# Patient Record
Sex: MALE | Race: ASIAN | NOT HISPANIC OR LATINO
[De-identification: names, ages, dates, MRNs, and addresses within clinical notes are randomized per-mention and may not be internally consistent; named-entity substitution may affect disease eponyms.]

---

## 2022-09-29 VITALS
TEMPERATURE: 96 F | DIASTOLIC BLOOD PRESSURE: 77 MMHG | HEART RATE: 83 BPM | OXYGEN SATURATION: 97 % | HEIGHT: 64 IN | SYSTOLIC BLOOD PRESSURE: 143 MMHG

## 2022-09-29 RX ORDER — LOSARTAN POTASSIUM 100 MG/1
1 TABLET, FILM COATED ORAL
Qty: 0 | Refills: 0 | DISCHARGE

## 2022-09-29 RX ORDER — CHLORHEXIDINE GLUCONATE 213 G/1000ML
1 SOLUTION TOPICAL ONCE
Refills: 0 | Status: DISCONTINUED | OUTPATIENT
Start: 2022-10-03 | End: 2022-10-17

## 2022-09-29 NOTE — H&P ADULT - NSHPLABSRESULTS_GEN_ALL_CORE
EKG: NSR at 81 bpm. 0.5-1 mm J point elevation V2-V3. 0.5 mm 1 mm ST depression V4-V6. TWI III. Q wave III

## 2022-09-29 NOTE — H&P ADULT - HISTORY OF PRESENT ILLNESS
COVID:    Pharmacy:   Cardiologist: Dr. Jerez  Escort:     62 year old Cantonese-speaking male, current smoker, with PMHx HTN, uncontrolled DM-2 (A1c 14.0) and HLD (, recently started on statin), moderate b/l carotid stenosis, presented to Dr. Jerez with c/o leg pain with ambulation and DAMON. He reports occasional chest pain but does not walk much due to DAMON and leg fatigue. Denies dizziness, palpitations, orthopnea/PND, leg swelling, LOC, bleeding, melena/hematochezia, fever, chills, URI symptoms, or recent illness. Exercise NST 9/2/22: hypotensive response to exercise with marked ST depression on EKG; inferior and inferolateral infarct with moderate edgardo-infarct ischemia; large anterior wall ischemia. TID 1.19. TTE 8/9/22: EF 40-45%, grade 1 diastolic dysfunction, akinetic basal inferolateral, basal inferior, mid inferolateral, mid inferior, and mid inferoseptal. Dyskinetic mid anterolateral. No significant valvular disease. LE arterial duplex- no hemodynamically significant stenosis.    In light of risk factors, CCS class III anginal equivalent symptoms, and high risk NST suggestive of multivessel CAD, patient is referred for cardiac catheterization with possible intervention if clinically indicated.    COVID:    Pharmacy:   Cardiologist: Dr. Jerez  Escort:     62 year old Cantonese-speaking male, current smoker, with PMHx HTN, uncontrolled DM-2 (A1c 14.0) and HLD (, recently started on statin), moderate b/l carotid stenosis, presented to Dr. Jerez with c/o leg pain with ambulation and DAMON. He reports occasional chest pain but does not walk much due to DAMON and leg fatigue. Denies dizziness, palpitations, orthopnea/PND, leg swelling, LOC, bleeding, melena/hematochezia, fever, chills, URI symptoms, or recent illness. Exercise NST 9/2/22: hypotensive response to exercise with marked ST depression on EKG; inferior and inferolateral infarct with moderate edgardo-infarct ischemia; large anterior wall ischemia., TID 1.19, EF 38% TTE 8/9/22: EF 40-45%, grade 1 diastolic dysfunction, akinetic basal inferolateral, basal inferior, mid inferolateral, mid inferior, and mid inferoseptal. Dyskinetic mid anterolateral. No significant valvular disease. LE arterial duplex- no hemodynamically significant stenosis.    In light of risk factors, CCS class III anginal equivalent symptoms, and high risk NST suggestive of multivessel CAD, patient is referred for cardiac catheterization with possible intervention if clinically indicated.    COVID:  9/30/22 negative in [facility]  Pharmacy:   Cardiologist: Dr. Jerez  Escort:     62 year old Cantonese-speaking male, current smoker, with PMHx HTN, uncontrolled DM-2 (A1c 14.0) and HLD (, recently started on statin), moderate b/l carotid stenosis, presented to Dr. Jerez with c/o leg pain with ambulation and DAMON. He reports occasional chest pain but does not walk much due to DAMON and leg fatigue. Denies dizziness, palpitations, orthopnea/PND, leg swelling, LOC, bleeding, melena/hematochezia, fever, chills, URI symptoms, or recent illness. Exercise NST 9/2/22: hypotensive response to exercise with marked ST depression on EKG; inferior and inferolateral infarct with moderate edgardo-infarct ischemia; large anterior wall ischemia., TID 1.19, EF 38% TTE 8/9/22: EF 40-45%, grade 1 diastolic dysfunction, akinetic basal inferolateral, basal inferior, mid inferolateral, mid inferior, and mid inferoseptal. Dyskinetic mid anterolateral. No significant valvular disease. LE arterial duplex- no hemodynamically significant stenosis.    In light of risk factors, CCS class III anginal equivalent symptoms, and high risk NST suggestive of multivessel CAD, patient is referred for cardiac catheterization with possible intervention if clinically indicated.    COVID:  9/30/22 negative in [facility]  Pharmacy: [facility]-medications obtained from list provided by patient's wife   Cardiologist: Dr. Jerez  Escort: Wife     62 year old Cantonese-speaking male, current smoker, with PMHx HTN, uncontrolled DM-2 (A1c 14.0) and HLD (, recently started on statin), moderate b/l carotid stenosis, presented to Dr. Jerez with c/o leg pain with ambulation and DAMON. He reports occasional chest pain but does not walk much due to DAMON and leg fatigue. Denies dizziness, palpitations, orthopnea/PND, leg swelling, LOC, bleeding, melena/hematochezia, fever, chills, URI symptoms, or recent illness. Exercise NST 9/2/22: hypotensive response to exercise with marked ST depression on EKG; inferior and inferolateral infarct with moderate edgardo-infarct ischemia; large anterior wall ischemia., TID 1.19, EF 38% TTE 8/9/22: EF 40-45%, grade 1 diastolic dysfunction, akinetic basal inferolateral, basal inferior, mid inferolateral, mid inferior, and mid inferoseptal. Dyskinetic mid anterolateral. No significant valvular disease. LE arterial duplex- no hemodynamically significant stenosis.    In light of risk factors, CCS class III anginal equivalent symptoms, and high risk NST suggestive of multivessel CAD, patient is referred for cardiac catheterization with possible intervention if clinically indicated.

## 2022-09-29 NOTE — H&P ADULT - ASSESSMENT
62 year old Cantonese-speaking male, current smoker, with PMHx HTN, uncontrolled DM-2 (A1c 14.0) and HLD (, recently started on statin), moderate b/l carotid stenosis, who presents for cardiac cath due to patient's risk factors, CCS class III anginal equivalent symptoms, and high risk NST suggestive of multivessel CAD.    ASA III          Mallampati III  Patient is a candidate for sedation: yes  Sedation: Moderate    Risks & benefits of procedure and alternative therapy have been explained to the patient including but not limited to: allergic reaction, bleeding w/possible need for blood transfusion, infection, renal and vascular compromise, limb damage, arrhythmia, stroke, vessel dissection/perforation, Myocardial infarction, emergent CABG. Informed consent obtained and in chart.       Patient denies bleeding, BRBPR, melena, hematuria, hematemesis.        cc Bolus IV x 1 followed by NS 75 cc/hr x 2 hrs per Hydration Protocol.  62 year old Cantonese-speaking male, current smoker, with PMHx HTN, uncontrolled DM-2 (A1c 14.0) and HLD (, recently started on statin), moderate b/l carotid stenosis, who presents for cardiac cath due to patient's risk factors, CCS class III anginal equivalent symptoms, and high risk NST suggestive of multivessel CAD.    ASA III          Mallampati III  Patient is a candidate for sedation: yes  Sedation: Moderate    Risks & benefits of procedure and alternative therapy have been explained to the patient including but not limited to: allergic reaction, bleeding w/possible need for blood transfusion, infection, renal and vascular compromise, limb damage, arrhythmia, stroke, vessel dissection/perforation, Myocardial infarction, emergent CABG. Informed consent obtained and in chart.       Labs significant for Anemia Hgb/HCT 11.1/33.1 and Platelets 195,000 (normal). Patient denies bleeding, BRBPR, melena, hematuria, hematemesis. Patient is not currently on ASA or Plavix.      EF 40-45% by echo 8/9/22. Patient euvolemic on exam. No JVD or LE edema. Lungs CTA.  cc Bolus IV x 1 followed by NS 75 cc/hr x 2 hrs per Hydration Protocol.  62 year old Cantonese-speaking male, current smoker, with PMHx HTN, uncontrolled DM-2 (A1c 14.0) and HLD (, recently started on statin), moderate b/l carotid stenosis, who presents for cardiac cath due to patient's risk factors, CCS class III anginal equivalent symptoms, and high risk NST suggestive of multivessel CAD.    ASA III          Mallampati III  Patient is a candidate for sedation: yes  Sedation: Moderate    Risks & benefits of procedure and alternative therapy have been explained to the patient including but not limited to: allergic reaction, bleeding w/possible need for blood transfusion, infection, renal and vascular compromise, limb damage, arrhythmia, stroke, vessel dissection/perforation, Myocardial infarction, emergent CABG. Informed consent obtained and in chart.       Labs significant for Anemia Hgb/HCT 11.1/33.1 and Platelets 195,000 (normal). Patient denies bleeding, BRBPR, melena, hematuria, hematemesis. Patient is not currently on ASA or Plavix. Given high risk NST suggestive of multivessel CAD no Plavix load prior to procedure. ASA  mg PO x 1 given prior to procedure.      EF 40-45% by echo 8/9/22. Patient euvolemic on exam. No JVD or LE edema. Lungs CTA.  cc Bolus IV x 1 followed by NS 75 cc/hr x 2 hrs per Hydration Protocol.

## 2022-09-29 NOTE — H&P ADULT - NSICDXPASTMEDICALHX_GEN_ALL_CORE_FT
PAST MEDICAL HISTORY:  Carotid stenosis     Hyperlipidemia     Hypertension     Ischemic cardiomyopathy     Type 2 diabetes mellitus

## 2022-09-29 NOTE — H&P ADULT - RESPIRATORY
clear to auscultation bilaterally/no wheezes/no rales/no rhonchi/no respiratory distress/no use of accessory muscles/respirations non-labored

## 2022-10-03 ENCOUNTER — RESULT REVIEW (OUTPATIENT)
Age: 62
End: 2022-10-03

## 2022-10-03 ENCOUNTER — OUTPATIENT (OUTPATIENT)
Dept: OUTPATIENT SERVICES | Facility: HOSPITAL | Age: 62
LOS: 1 days | Discharge: HOME | End: 2022-10-03
Payer: COMMERCIAL

## 2022-10-03 PROBLEM — E78.5 HYPERLIPIDEMIA, UNSPECIFIED: Chronic | Status: ACTIVE | Noted: 2022-09-29

## 2022-10-03 PROBLEM — I25.5 ISCHEMIC CARDIOMYOPATHY: Chronic | Status: ACTIVE | Noted: 2022-09-29

## 2022-10-03 PROBLEM — I65.29 OCCLUSION AND STENOSIS OF UNSPECIFIED CAROTID ARTERY: Chronic | Status: ACTIVE | Noted: 2022-09-29

## 2022-10-03 PROBLEM — I10 ESSENTIAL (PRIMARY) HYPERTENSION: Chronic | Status: ACTIVE | Noted: 2022-09-29

## 2022-10-03 PROBLEM — E11.9 TYPE 2 DIABETES MELLITUS WITHOUT COMPLICATIONS: Chronic | Status: ACTIVE | Noted: 2022-09-29

## 2022-10-03 LAB
A1C WITH ESTIMATED AVERAGE GLUCOSE RESULT: 8.9 % — HIGH (ref 4–5.6)
ALBUMIN SERPL ELPH-MCNC: 4.1 G/DL — SIGNIFICANT CHANGE UP (ref 3.3–5)
ALP SERPL-CCNC: 94 U/L — SIGNIFICANT CHANGE UP (ref 40–120)
ALT FLD-CCNC: 22 U/L — SIGNIFICANT CHANGE UP (ref 10–45)
ANION GAP SERPL CALC-SCNC: 10 MMOL/L — SIGNIFICANT CHANGE UP (ref 5–17)
APPEARANCE UR: CLEAR — SIGNIFICANT CHANGE UP
APTT BLD: 35.4 SEC — SIGNIFICANT CHANGE UP (ref 27.5–35.5)
AST SERPL-CCNC: 24 U/L — SIGNIFICANT CHANGE UP (ref 10–40)
BACTERIA # UR AUTO: PRESENT /HPF
BASOPHILS # BLD AUTO: 0.06 K/UL — SIGNIFICANT CHANGE UP (ref 0–0.2)
BASOPHILS NFR BLD AUTO: 0.9 % — SIGNIFICANT CHANGE UP (ref 0–2)
BILIRUB SERPL-MCNC: 0.4 MG/DL — SIGNIFICANT CHANGE UP (ref 0.2–1.2)
BILIRUB UR-MCNC: NEGATIVE — SIGNIFICANT CHANGE UP
BLD GP AB SCN SERPL QL: NEGATIVE — SIGNIFICANT CHANGE UP
BUN SERPL-MCNC: 54 MG/DL — HIGH (ref 7–23)
CALCIUM SERPL-MCNC: 10.3 MG/DL — SIGNIFICANT CHANGE UP (ref 8.4–10.5)
CHLORIDE SERPL-SCNC: 102 MMOL/L — SIGNIFICANT CHANGE UP (ref 96–108)
CHOLEST SERPL-MCNC: 126 MG/DL — SIGNIFICANT CHANGE UP
CK MB CFR SERPL CALC: 4.6 NG/ML — SIGNIFICANT CHANGE UP (ref 0–6.7)
CK SERPL-CCNC: 93 U/L — SIGNIFICANT CHANGE UP (ref 30–200)
CO2 SERPL-SCNC: 28 MMOL/L — SIGNIFICANT CHANGE UP (ref 22–31)
COLOR SPEC: YELLOW — SIGNIFICANT CHANGE UP
CREAT SERPL-MCNC: 1.4 MG/DL — HIGH (ref 0.5–1.3)
DIFF PNL FLD: ABNORMAL
EGFR: 56 ML/MIN/1.73M2 — LOW
EOSINOPHIL # BLD AUTO: 0.2 K/UL — SIGNIFICANT CHANGE UP (ref 0–0.5)
EOSINOPHIL NFR BLD AUTO: 2.9 % — SIGNIFICANT CHANGE UP (ref 0–6)
EPI CELLS # UR: SIGNIFICANT CHANGE UP /HPF (ref 0–5)
ESTIMATED AVERAGE GLUCOSE: 209 MG/DL — HIGH (ref 68–114)
GLUCOSE BLDC GLUCOMTR-MCNC: 119 MG/DL — HIGH (ref 70–99)
GLUCOSE BLDC GLUCOMTR-MCNC: 139 MG/DL — HIGH (ref 70–99)
GLUCOSE SERPL-MCNC: 155 MG/DL — HIGH (ref 70–99)
GLUCOSE UR QL: NEGATIVE — SIGNIFICANT CHANGE UP
HBV SURFACE AG SER-ACNC: SIGNIFICANT CHANGE UP
HCT VFR BLD CALC: 33.1 % — LOW (ref 39–50)
HCV AB S/CO SERPL IA: 0.04 S/CO — SIGNIFICANT CHANGE UP
HCV AB SERPL-IMP: SIGNIFICANT CHANGE UP
HDLC SERPL-MCNC: 58 MG/DL — SIGNIFICANT CHANGE UP
HGB BLD-MCNC: 11.1 G/DL — LOW (ref 13–17)
IMM GRANULOCYTES NFR BLD AUTO: 0.1 % — SIGNIFICANT CHANGE UP (ref 0–0.9)
INR BLD: 0.93 — SIGNIFICANT CHANGE UP (ref 0.88–1.16)
KETONES UR-MCNC: NEGATIVE — SIGNIFICANT CHANGE UP
LEUKOCYTE ESTERASE UR-ACNC: NEGATIVE — SIGNIFICANT CHANGE UP
LIPID PNL WITH DIRECT LDL SERPL: 55 MG/DL — SIGNIFICANT CHANGE UP
LYMPHOCYTES # BLD AUTO: 1.14 K/UL — SIGNIFICANT CHANGE UP (ref 1–3.3)
LYMPHOCYTES # BLD AUTO: 16.3 % — SIGNIFICANT CHANGE UP (ref 13–44)
MAGNESIUM SERPL-MCNC: 2.1 MG/DL — SIGNIFICANT CHANGE UP (ref 1.6–2.6)
MCHC RBC-ENTMCNC: 29.1 PG — SIGNIFICANT CHANGE UP (ref 27–34)
MCHC RBC-ENTMCNC: 33.5 GM/DL — SIGNIFICANT CHANGE UP (ref 32–36)
MCV RBC AUTO: 86.9 FL — SIGNIFICANT CHANGE UP (ref 80–100)
MONOCYTES # BLD AUTO: 0.52 K/UL — SIGNIFICANT CHANGE UP (ref 0–0.9)
MONOCYTES NFR BLD AUTO: 7.4 % — SIGNIFICANT CHANGE UP (ref 2–14)
NEUTROPHILS # BLD AUTO: 5.07 K/UL — SIGNIFICANT CHANGE UP (ref 1.8–7.4)
NEUTROPHILS NFR BLD AUTO: 72.4 % — SIGNIFICANT CHANGE UP (ref 43–77)
NITRITE UR-MCNC: NEGATIVE — SIGNIFICANT CHANGE UP
NON HDL CHOLESTEROL: 68 MG/DL — SIGNIFICANT CHANGE UP
NRBC # BLD: 0 /100 WBCS — SIGNIFICANT CHANGE UP (ref 0–0)
PH UR: 6 — SIGNIFICANT CHANGE UP (ref 5–8)
PLATELET # BLD AUTO: 195 K/UL — SIGNIFICANT CHANGE UP (ref 150–400)
POTASSIUM SERPL-MCNC: 4.7 MMOL/L — SIGNIFICANT CHANGE UP (ref 3.5–5.3)
POTASSIUM SERPL-SCNC: 4.7 MMOL/L — SIGNIFICANT CHANGE UP (ref 3.5–5.3)
PROT SERPL-MCNC: 6.6 G/DL — SIGNIFICANT CHANGE UP (ref 6–8.3)
PROT UR-MCNC: 100 MG/DL
PROTHROM AB SERPL-ACNC: 11.1 SEC — SIGNIFICANT CHANGE UP (ref 10.5–13.4)
RBC # BLD: 3.81 M/UL — LOW (ref 4.2–5.8)
RBC # FLD: 13.6 % — SIGNIFICANT CHANGE UP (ref 10.3–14.5)
RBC CASTS # UR COMP ASSIST: ABNORMAL /HPF
RH IG SCN BLD-IMP: POSITIVE — SIGNIFICANT CHANGE UP
RH IG SCN BLD-IMP: POSITIVE — SIGNIFICANT CHANGE UP
SODIUM SERPL-SCNC: 140 MMOL/L — SIGNIFICANT CHANGE UP (ref 135–145)
SP GR SPEC: 1.01 — SIGNIFICANT CHANGE UP (ref 1–1.03)
TRIGL SERPL-MCNC: 66 MG/DL — SIGNIFICANT CHANGE UP
TSH SERPL-MCNC: 3.93 UIU/ML — SIGNIFICANT CHANGE UP (ref 0.27–4.2)
UROBILINOGEN FLD QL: 0.2 E.U./DL — SIGNIFICANT CHANGE UP
WBC # BLD: 7 K/UL — SIGNIFICANT CHANGE UP (ref 3.8–10.5)
WBC # FLD AUTO: 7 K/UL — SIGNIFICANT CHANGE UP (ref 3.8–10.5)
WBC UR QL: < 5 /HPF — SIGNIFICANT CHANGE UP

## 2022-10-03 PROCEDURE — 99205 OFFICE O/P NEW HI 60 MIN: CPT

## 2022-10-03 PROCEDURE — 83695 ASSAY OF LIPOPROTEIN(A): CPT

## 2022-10-03 PROCEDURE — 86900 BLOOD TYPING SEROLOGIC ABO: CPT

## 2022-10-03 PROCEDURE — 81001 URINALYSIS AUTO W/SCOPE: CPT

## 2022-10-03 PROCEDURE — 86803 HEPATITIS C AB TEST: CPT

## 2022-10-03 PROCEDURE — 86850 RBC ANTIBODY SCREEN: CPT

## 2022-10-03 PROCEDURE — C1769: CPT

## 2022-10-03 PROCEDURE — 84443 ASSAY THYROID STIM HORMONE: CPT

## 2022-10-03 PROCEDURE — 87340 HEPATITIS B SURFACE AG IA: CPT

## 2022-10-03 PROCEDURE — 85025 COMPLETE CBC W/AUTO DIFF WBC: CPT

## 2022-10-03 PROCEDURE — 71045 X-RAY EXAM CHEST 1 VIEW: CPT

## 2022-10-03 PROCEDURE — 83036 HEMOGLOBIN GLYCOSYLATED A1C: CPT

## 2022-10-03 PROCEDURE — 83735 ASSAY OF MAGNESIUM: CPT

## 2022-10-03 PROCEDURE — 80061 LIPID PANEL: CPT

## 2022-10-03 PROCEDURE — 94010 BREATHING CAPACITY TEST: CPT | Mod: 26

## 2022-10-03 PROCEDURE — 94150 VITAL CAPACITY TEST: CPT

## 2022-10-03 PROCEDURE — C1887: CPT

## 2022-10-03 PROCEDURE — 85730 THROMBOPLASTIN TIME PARTIAL: CPT

## 2022-10-03 PROCEDURE — 80053 COMPREHEN METABOLIC PANEL: CPT

## 2022-10-03 PROCEDURE — 93458 L HRT ARTERY/VENTRICLE ANGIO: CPT

## 2022-10-03 PROCEDURE — 86901 BLOOD TYPING SEROLOGIC RH(D): CPT

## 2022-10-03 PROCEDURE — 82550 ASSAY OF CK (CPK): CPT

## 2022-10-03 PROCEDURE — C1894: CPT

## 2022-10-03 PROCEDURE — 71045 X-RAY EXAM CHEST 1 VIEW: CPT | Mod: 26

## 2022-10-03 PROCEDURE — 93005 ELECTROCARDIOGRAM TRACING: CPT

## 2022-10-03 PROCEDURE — 82962 GLUCOSE BLOOD TEST: CPT

## 2022-10-03 PROCEDURE — 93010 ELECTROCARDIOGRAM REPORT: CPT

## 2022-10-03 PROCEDURE — 82553 CREATINE MB FRACTION: CPT

## 2022-10-03 PROCEDURE — 85610 PROTHROMBIN TIME: CPT

## 2022-10-03 PROCEDURE — 99152 MOD SED SAME PHYS/QHP 5/>YRS: CPT

## 2022-10-03 RX ORDER — DEXTROSE 50 % IN WATER 50 %
15 SYRINGE (ML) INTRAVENOUS ONCE
Refills: 0 | Status: DISCONTINUED | OUTPATIENT
Start: 2022-10-03 | End: 2022-10-17

## 2022-10-03 RX ORDER — ASPIRIN/CALCIUM CARB/MAGNESIUM 324 MG
1 TABLET ORAL
Qty: 0 | Refills: 0 | DISCHARGE

## 2022-10-03 RX ORDER — DEXTROSE 50 % IN WATER 50 %
25 SYRINGE (ML) INTRAVENOUS ONCE
Refills: 0 | Status: DISCONTINUED | OUTPATIENT
Start: 2022-10-03 | End: 2022-10-17

## 2022-10-03 RX ORDER — SODIUM CHLORIDE 9 MG/ML
500 INJECTION INTRAMUSCULAR; INTRAVENOUS; SUBCUTANEOUS
Refills: 0 | Status: DISCONTINUED | OUTPATIENT
Start: 2022-10-03 | End: 2022-10-03

## 2022-10-03 RX ORDER — SODIUM CHLORIDE 9 MG/ML
500 INJECTION INTRAMUSCULAR; INTRAVENOUS; SUBCUTANEOUS
Refills: 0 | Status: DISCONTINUED | OUTPATIENT
Start: 2022-10-03 | End: 2022-10-17

## 2022-10-03 RX ORDER — METFORMIN HYDROCHLORIDE 850 MG/1
0.5 TABLET ORAL
Qty: 0 | Refills: 0 | DISCHARGE

## 2022-10-03 RX ORDER — GLUCAGON INJECTION, SOLUTION 0.5 MG/.1ML
1 INJECTION, SOLUTION SUBCUTANEOUS ONCE
Refills: 0 | Status: DISCONTINUED | OUTPATIENT
Start: 2022-10-03 | End: 2022-10-17

## 2022-10-03 RX ORDER — DEXTROSE 50 % IN WATER 50 %
12.5 SYRINGE (ML) INTRAVENOUS ONCE
Refills: 0 | Status: DISCONTINUED | OUTPATIENT
Start: 2022-10-03 | End: 2022-10-17

## 2022-10-03 RX ORDER — ASPIRIN/CALCIUM CARB/MAGNESIUM 324 MG
325 TABLET ORAL ONCE
Refills: 0 | Status: COMPLETED | OUTPATIENT
Start: 2022-10-03 | End: 2022-10-03

## 2022-10-03 RX ORDER — SODIUM CHLORIDE 9 MG/ML
1000 INJECTION, SOLUTION INTRAVENOUS
Refills: 0 | Status: DISCONTINUED | OUTPATIENT
Start: 2022-10-03 | End: 2022-10-17

## 2022-10-03 RX ORDER — SODIUM CHLORIDE 9 MG/ML
250 INJECTION INTRAMUSCULAR; INTRAVENOUS; SUBCUTANEOUS ONCE
Refills: 0 | Status: COMPLETED | OUTPATIENT
Start: 2022-10-03 | End: 2022-10-03

## 2022-10-03 RX ORDER — INSULIN LISPRO 100/ML
VIAL (ML) SUBCUTANEOUS ONCE
Refills: 0 | Status: DISCONTINUED | OUTPATIENT
Start: 2022-10-03 | End: 2022-10-17

## 2022-10-03 RX ADMIN — SODIUM CHLORIDE 75 MILLILITER(S): 9 INJECTION INTRAMUSCULAR; INTRAVENOUS; SUBCUTANEOUS at 12:16

## 2022-10-03 RX ADMIN — SODIUM CHLORIDE 500 MILLILITER(S): 9 INJECTION INTRAMUSCULAR; INTRAVENOUS; SUBCUTANEOUS at 12:00

## 2022-10-03 RX ADMIN — Medication 325 MILLIGRAM(S): at 12:26

## 2022-10-03 NOTE — CONSULT NOTE ADULT - SUBJECTIVE AND OBJECTIVE BOX
Surgeon: Dr. Graves   Requesting Physician: Dr. Jerez  HISTORY OF PRESENT ILLNESS: 62 year old Cantonese-speaking male, current smoker, with PMHx HTN, uncontrolled DM-2 (A1c 14.0) and HLD (, recently started on statin), moderate b/l carotid stenosis, presented to Dr. Jerez with c/o leg pain with ambulation and DAMON. He reports occasional chest pain but does not walk much due to DAMON and leg fatigue. Denies dizziness, palpitations, orthopnea/PND, leg swelling, LOC, bleeding, melena/hematochezia, fever, chills, URI symptoms, or recent illness. Exercise NST 9/2/22: hypotensive response to exercise with marked ST depression on EKG; inferior and inferolateral infarct with moderate edgardo-infarct ischemia; large anterior wall ischemia., TID 1.19, EF 38% TTE 8/9/22: EF 40-45%, grade 1 diastolic dysfunction, akinetic basal inferolateral, basal inferior, mid inferolateral, mid inferior, and mid inferoseptal. Dyskinetic mid anterolateral. No significant valvular disease. LE arterial duplex- no hemodynamically significant stenosis.  In light of risk factors, CCS class III anginal equivalent symptoms, and high risk NST suggestive of multivessel CAD, patient is referred for cardiac catheterization. Patient is now s/p cardiac catheterization revealing 95% mLAD, Diag1 50%, mLCx 100%  (with L-L collaterals), RCA subtotal 90% stenosis, dRCA 80% diffuse disease, EF 35-40% with mid/basal akinesis. EDP 9 mmHG.  CTS Dr. Graves consulted for CABG evaluation.  PAST MEDICAL & SURGICAL HISTORY: Ischemic cardiomyopathy Hypertension Hyperlipidemia Type 2 diabetes mellitus Carotid stenosis No significant past surgical history  MEDICATIONS   · 	Actos 30 mg oral tablet: Last Dose Taken: 02-Oct-2022 , 1 tab(s) orally once a day · 	atorvastatin 40 mg oral tablet: Last Dose Taken: 02-Oct-2022 , 1 tab(s) orally once a day · 	metoprolol succinate 25 mg oral tablet, extended release: Last Dose Taken: 02-Oct-2022 , 1 tab(s) orally once a day · 	losartan 50 mg oral tablet: Last Dose Taken: 02-Oct-2022 , 1 tab(s) orally once a day · 	metFORMIN 1000 mg oral tablet: Last Dose Taken: 02-Oct-2022 , 1 tab(s) orally 2 times a day · 	glipiZIDE 10 mg oral tablet: Last Dose Taken: 02-Oct-2022 , 1 tab(s) orally once a day   Allergies  No Known Allergies  Intolerances    SOCIAL HISTORY:  Social History: · Substance use	No · Social History (marital status, living situation, occupation, and sexual history)	current smoker (1/2PPD x 47 years), no ETOH or illicits  FAMILY HISTORY: No pertinent family history in first degree relatives    Review of Systems CONSTITUTIONAL:  Fevers / chills, sweats, fatigue, weight loss, weight gain                                    NEGATIVE NEURO:  parathesias, seizures, syncope, confusion                                                                               NEGATIVE EYES:  Blurry vision, discharge, pain, loss of vision                                                                                  NEGATIVE ENMT:  Difficulty hearing, vertigo, dysphagia, epistaxis, recent dental work                                     NEGATIVE CV:  Chest pain, palpitations, +DAMON, orthopnea   +claudication                                                                                    RESPIRATORY:  Wheezing, SOB, cough / sputum, hemoptysis                                                              NEGATIVE GI:  Nausea, vommiting, diarrhea, constipation, melena                                                                        NEGATIVE : Hematuria, dysuria, urgency, incontinence                                                                                       NEGATIVE MUSKULOSKELETAL:  arthritis, joint swelling, muscle weakness                                                           NEGATIVE SKIN/BREAST:  rash, itching, colt loss, masses                                                                                            NEGATIVE PSYCH:  depresion, anxiety, suicidal ideation                                                                                             NEGATIVE HEME/LYMPH:  bruises easily, enlarged lymph nodes, tender lymph nodes                                        NEGATIVE ENDOCRINE:  cold intolerance, heat intolerance, polydipsia                                                                   NEGATIVE  PHYSICAL EXAM Physical Exam:   Height/Weight/BSA/BMI: · Height (FEET)	5 Feet · Height (INCHES)	4 Inch(s) · Height (CENTIMETERS)	162.56 Centimeter(s)   T/HR/RR/BP: · Temp (F)	  96.1 Degrees F · Temp (C)	  35.6 Degrees C · Heart Rate	83 /min · BP Systolic	143 mm Hg · BP Diastolic	77 mm Hg · BP Noninvasive Mean	99 mm Hg · SpO2 (%)	97 % · O2 Delivery/Oxygen Delivery Method	room air   Physical Exam: · Constitutional	well-groomed; no distress · Eyes	PERRL; EOMI; conjunctiva clear · ENMT	no gross abnormalities · Respiratory	clear to auscultation bilaterally; no wheezes; no rales; no rhonchi; no respiratory distress; no use of accessory muscles; respirations non-labored · Cardiovascular	regular rate and rhythm; S1 S2 present; no murmur; no JVD; no pedal edema; vascular · Radial Pulse	right normal; left normal; 2+ Bilaterally · Femoral Pulse	right normal; left normal; 2+ Bilaterally-No bruits · DP Pulse	right normal; left normal; 2+ Bilaterally · PT Pulse	Faint to 1+ Bilaterally · Gastrointestinal	soft; nontender; nondistended; normal active bowel sounds · Neurological	cranial nerves II-XII intact; responds to pain; responds to verbal commands · Musculoskeletal	strength 5/5 bilateral upper extremities; strength 5/5 bilateral lower extremities · Psychiatric	normal affect; alert and oriented x3; normal behavior                                    LABS:                      11.1  7.00  )-----------( 195      ( 03 Oct 2022 11:10 )            33.1   10-03  140  |  102  |  54<H> ----------------------------<  155<H> 4.7   |  28  |  1.40<H>  Ca    10.3      03 Oct 2022 11:10 Mg     2.1     10-03  TPro  6.6  /  Alb  4.1  /  TBili  0.4  /  DBili  x   /  AST  24  /  ALT  22  /  AlkPhos  94  10-03  PT/INR - ( 03 Oct 2022 11:10 )   PT: 11.1 sec;   INR: 0.93       PTT - ( 03 Oct 2022 11:10 )  PTT:35.4 sec  CARDIAC MARKERS ( 03 Oct 2022 11:10 ) x     / x     / 93 U/L / x     / 4.6 ng/mL    Thyroid Stimulating Hormone, Serum: 3.930 uIU/mL (10-03 @ 15:51)    RADIOLOGY & ADDITIONAL STUDIES: CAROTID U/S: moderate b/l carotid stenosis   CXR: pending   CT Scan: NA  EKG: NSR at 81 bpm. 0.5-1 mm J point elevation V2-V3. 0.5 mm 1 mm ST depression V4-V6. TWI III. Q wave III  TTE / SARITHA: EF 40-45%, grade 1 diastolic dysfunction, akinetic basal inferolateral, basal inferior, mid inferolateral, mid inferior, and mid inferoseptal. Dyskinetic mid anterolateral. No significant valvular disease.    Cardiac Cath:

## 2022-10-03 NOTE — CONSULT NOTE ADULT - ASSESSMENT
Mr. Kna a 62 year old male with stable angina, positive stress test and 3-vessel CAD  presents for surgical consult. Dr. Graves reviewed the cardiac cath imaging and reports with the patient and his spouse and discussed the case with Dr.Yi-Ming Jerez.  Dr. Graves discussed the risks, benefits and alternatives to surgery. Dr. Graves performed the STS risk calculator and quoted a 2-3%  operative mortality and complication risk and  discussed the STS risk factors with the patient include but not limited to death, heart attack, bleeding, stroke, kidney problems and infection. He also discussed the various approaches, minimally invasive versus sternotomy. Dr. Graves feels the patient will benefit and is a candidate for a OPCABG. All questions were addressed and patient agrees to proceed with surgery on 10/19/22.

## 2022-10-03 NOTE — PROGRESS NOTE ADULT - SUBJECTIVE AND OBJECTIVE BOX
Interventional Cardiology PA SDA Discharge Note    Patient without complaints. Ambulated and voided without difficulties    Afebrile, VSS    Ext:    		Right TR band: stable; no hematoma, no bleed, 2+ radial pulse     A/P:    62 year old Cantonese-speaking male, current smoker, with PMHx HTN, uncontrolled DM-2 (A1c 14.0) and HLD (, recently started on statin), moderate b/l carotid stenosis, presented to Dr. Jerez with c/o leg pain with ambulation and DAMON. He reports occasional chest pain but does not walk much due to DAMON and leg fatigue. Denies dizziness, palpitations, orthopnea/PND, leg swelling, LOC, bleeding, melena/hematochezia, fever, chills, URI symptoms, or recent illness. Exercise NST 9/2/22: hypotensive response to exercise with marked ST depression on EKG; inferior and inferolateral infarct with moderate edgardo-infarct ischemia; large anterior wall ischemia., TID 1.19, EF 38% TTE 8/9/22: EF 40-45%, grade 1 diastolic dysfunction, akinetic basal inferolateral, basal inferior, mid inferolateral, mid inferior, and mid inferoseptal. Dyskinetic mid anterolateral. No significant valvular disease. LE arterial duplex- no hemodynamically significant stenosis.    In light of risk factors, CCS class III anginal equivalent symptoms, and high risk NST suggestive of multivessel CAD, patient is referred for cardiac catheterization with possible intervention if clinically indicated.     Patient is now s/p cardiac catheterization revealing 95% mLAD, Diag1 50%, mLCx 100%  (with L-L collaterals), RCA subtotal 90% stenosis, dRCA 80% diffuse disease, EF 35-40% with mid/basal akinesis. EDP 15 mmHG. CTS Dr. Graves consulted for possible CABG. Pending Dr. Graves's recommendations patient will be admitted for surgery this admission vs outpatient follow up and scheduled surgery.               1.	Stable for discharge as per attending Dr. Jerez after bed rest, pt voids, groin/wrist stable and 30 minutes of ambulation.  2.	Follow-up with PMD/Cardiologist Solitario in 1-2 weeks  3.	Discharged forms signed and copies in chart    Interventional Cardiology PA SDA Discharge Note    Patient without complaints. Ambulated and voided without difficulties    Afebrile, VSS    Ext:    		Right TR band: stable; no hematoma, no bleed, 2+ radial pulse     A/P:    62 year old Cantonese-speaking male, current smoker, with PMHx HTN, uncontrolled DM-2 (A1c 14.0) and HLD (, recently started on statin), moderate b/l carotid stenosis, presented to Dr. Jerez with c/o leg pain with ambulation and DAMON. He reports occasional chest pain but does not walk much due to DAMON and leg fatigue. Denies dizziness, palpitations, orthopnea/PND, leg swelling, LOC, bleeding, melena/hematochezia, fever, chills, URI symptoms, or recent illness. Exercise NST 9/2/22: hypotensive response to exercise with marked ST depression on EKG; inferior and inferolateral infarct with moderate edgardo-infarct ischemia; large anterior wall ischemia., TID 1.19, EF 38% TTE 8/9/22: EF 40-45%, grade 1 diastolic dysfunction, akinetic basal inferolateral, basal inferior, mid inferolateral, mid inferior, and mid inferoseptal. Dyskinetic mid anterolateral. No significant valvular disease. LE arterial duplex- no hemodynamically significant stenosis.    In light of risk factors, CCS class III anginal equivalent symptoms, and high risk NST suggestive of multivessel CAD, patient is referred for cardiac catheterization with possible intervention if clinically indicated.     Patient is now s/p cardiac catheterization revealing 95% mLAD, Diag1 50%, mLCx 100%  (with L-L collaterals), RCA subtotal 90% stenosis, dRCA 80% diffuse disease, EF 35-40% with mid/basal akinesis. EDP 15 mmHG. CTS Dr. Graves consulted for possible CABG. with plan for patient to return next week for OR. Needs to complete pre op work up prior to discharge today. Patient instructed to STOP Losartan 48 hours prior to OR.             1.	Stable for discharge as per attending Dr. Jerez after bed rest, pt voids, groin/wrist stable and 30 minutes of ambulation.  2.	Follow-up with PMD/Cardiologist Solitario in 1-2 weeks  3.	Discharged forms signed and copies in chart    Interventional Cardiology PA SDA Discharge Note    Patient without complaints. Ambulated and voided without difficulties    Afebrile, VSS    Ext:    		Right TR band: stable; no hematoma, no bleed, 2+ radial pulse     A/P:    62 year old Cantonese-speaking male, current smoker, with PMHx HTN, uncontrolled DM-2 (A1c 14.0) and HLD (, recently started on statin), moderate b/l carotid stenosis, presented to Dr. Jerez with c/o leg pain with ambulation and DAMON. He reports occasional chest pain but does not walk much due to DAMON and leg fatigue. Denies dizziness, palpitations, orthopnea/PND, leg swelling, LOC, bleeding, melena/hematochezia, fever, chills, URI symptoms, or recent illness. Exercise NST 9/2/22: hypotensive response to exercise with marked ST depression on EKG; inferior and inferolateral infarct with moderate edgardo-infarct ischemia; large anterior wall ischemia., TID 1.19, EF 38% TTE 8/9/22: EF 40-45%, grade 1 diastolic dysfunction, akinetic basal inferolateral, basal inferior, mid inferolateral, mid inferior, and mid inferoseptal. Dyskinetic mid anterolateral. No significant valvular disease. LE arterial duplex- no hemodynamically significant stenosis.    In light of risk factors, CCS class III anginal equivalent symptoms, and high risk NST suggestive of multivessel CAD, patient is referred for cardiac catheterization with possible intervention if clinically indicated.     Patient is now s/p cardiac catheterization revealing 95% mLAD, Diag1 50%, mLCx 100%  (with L-L collaterals), RCA subtotal 90% stenosis, dRCA 80% diffuse disease, EF 35-40% with mid/basal akinesis. EDP 9 mmHG. CTS Dr. Graves consulted for possible CABG. with plan for patient to return next week for OR. Needs to complete pre op work up prior to discharge today. Patient instructed to STOP Losartan 48 hours prior to OR.             1.	Stable for discharge as per attending Dr. Jerez after bed rest, pt voids, groin/wrist stable and 30 minutes of ambulation.  2.	Follow-up with PMD/Cardiologist Solitario in 1-2 weeks  3.	Discharged forms signed and copies in chart

## 2022-10-05 LAB — LIDOCAIN SERPL-MCNC: 19 NMOL/L — SIGNIFICANT CHANGE UP

## 2022-10-06 DIAGNOSIS — I25.10 ATHEROSCLEROTIC HEART DISEASE OF NATIVE CORONARY ARTERY W/OUT ANGINA PECTORIS: ICD-10-CM

## 2022-10-14 DIAGNOSIS — I25.82 CHRONIC TOTAL OCCLUSION OF CORONARY ARTERY: ICD-10-CM

## 2022-10-14 DIAGNOSIS — I25.118 ATHEROSCLEROTIC HEART DISEASE OF NATIVE CORONARY ARTERY WITH OTHER FORMS OF ANGINA PECTORIS: ICD-10-CM

## 2022-10-14 DIAGNOSIS — R94.39 ABNORMAL RESULT OF OTHER CARDIOVASCULAR FUNCTION STUDY: ICD-10-CM

## 2022-10-17 RX ORDER — METFORMIN HYDROCHLORIDE 1000 MG/1
1000 TABLET, COATED ORAL DAILY
Qty: 30 | Refills: 3 | Status: ACTIVE | COMMUNITY
Start: 2022-10-17

## 2022-10-17 RX ORDER — PIOGLITAZONE 30 MG/1
30 TABLET ORAL DAILY
Refills: 0 | Status: ACTIVE | COMMUNITY
Start: 2022-10-17

## 2022-10-17 RX ORDER — METOPROLOL SUCCINATE 25 MG/1
25 TABLET, EXTENDED RELEASE ORAL DAILY
Qty: 30 | Refills: 0 | Status: ACTIVE | COMMUNITY
Start: 2022-10-17

## 2022-10-17 RX ORDER — ATORVASTATIN CALCIUM 40 MG/1
40 TABLET, FILM COATED ORAL
Qty: 1 | Refills: 1 | Status: ACTIVE | COMMUNITY
Start: 2022-10-17

## 2022-10-17 NOTE — H&P ADULT - NSHPPHYSICALEXAM_GEN_ALL_CORE
PHYSICAL EXAM  Physical Exam:    Height/Weight/BSA/BMI:  · Height (FEET)	5 Feet  · Height (INCHES)	4 Inch(s)  · Height (CENTIMETERS)	162.56 Centimeter(s)     T/HR/RR/BP:  · Temp (F)	 96.1 Degrees F  · Temp (C)	 35.6 Degrees C  · Heart Rate	83 /min  · BP Systolic	143 mm Hg  · BP Diastolic	77 mm Hg  · BP Noninvasive Mean	99 mm Hg  · SpO2 (%)	97 %  · O2 Delivery/Oxygen Delivery Method	room air     Physical Exam:  · Constitutional	well-groomed; no distress  · Eyes	PERRL; EOMI; conjunctiva clear  · ENMT	no gross abnormalities  · Respiratory	clear to auscultation bilaterally; no wheezes; no rales; no rhonchi; no respiratory distress; no use of accessory muscles; respirations non-labored  · Cardiovascular	regular rate and rhythm; S1 S2 present; no murmur; no JVD; no pedal edema; vascular  · Radial Pulse	right normal; left normal; 2+ Bilaterally  · Femoral Pulse	right normal; left normal; 2+ Bilaterally-No bruits  · DP Pulse	right normal; left normal; 2+ Bilaterally  · PT Pulse	Faint to 1+ Bilaterally  · Gastrointestinal	soft; nontender; nondistended; normal active bowel sounds  · Neurological	cranial nerves II-XII intact; responds to pain; responds to verbal commands  · Musculoskeletal	strength 5/5 bilateral upper extremities; strength 5/5 bilateral lower extremities  · Psychiatric	normal affect; alert and oriented x3; normal behavior

## 2022-10-17 NOTE — H&P ADULT - NSHPLABSRESULTS_GEN_ALL_CORE
LABS:                        11.1   7.00  )-----------( 195      ( 03 Oct 2022 11:10 )             33.1     10-03    140  |  102  |  54<H>  ----------------------------<  155<H>  4.7   |  28  |  1.40<H>    Ca    10.3      03 Oct 2022 11:10  Mg     2.1     10-03    TPro  6.6  /  Alb  4.1  /  TBili  0.4  /  DBili  x   /  AST  24  /  ALT  22  /  AlkPhos  94  10-03    PT/INR - ( 03 Oct 2022 11:10 )   PT: 11.1 sec;   INR: 0.93          PTT - ( 03 Oct 2022 11:10 )  PTT:35.4 sec    CARDIAC MARKERS ( 03 Oct 2022 11:10 )  x     / x     / 93 U/L / x     / 4.6 ng/mL        Thyroid Stimulating Hormone, Serum: 3.930 uIU/mL (10-03 @ 15:51)        RADIOLOGY & ADDITIONAL STUDIES:  CAROTID U/S: moderate b/l carotid stenosis     CXR: < from: Xray Chest 1 View- PORTABLE-Routine (Xray Chest 1 View- PORTABLE-Routine .) (10.03.22 @ 15:41) >    impression: Heart size within normal limits, thoracic aortic   calcification. Lungs and mediastinum are unremarkable. Left fifth rib old   fracture.    < end of copied text >      EKG: NSR at 81 bpm. 0.5-1 mm J point elevation V2-V3. 0.5 mm 1 mm ST depression V4-V6. TWI III. Q wave III    TTE / SARITHA: EF 40-45%, grade 1 diastolic dysfunction, akinetic basal inferolateral, basal inferior, mid inferolateral, mid inferior, and mid inferoseptal. Dyskinetic mid anterolateral. No significant valvular disease.

## 2022-10-17 NOTE — H&P ADULT - ASSESSMENT
CAD    Mr. Kan a 62 year old male with stable angina, positive stress test and 3-vessel CAD  presents for surgical consult. Dr. Graves reviewed the cardiac cath imaging and reports with the patient and his spouse and discussed the case with Dr.Yi-Ming Jerez.  Dr. Graves discussed the risks, benefits and alternatives to surgery. Dr. Graves performed the STS risk calculator and quoted a 2-3%  operative mortality and complication risk and  discussed the STS risk factors with the patient include but not limited to death, heart attack, bleeding, stroke, kidney problems and infection. He also discussed the various approaches, minimally invasive versus sternotomy. Dr. Graves feels the patient will benefit and is a candidate for a OPCABG. All questions were addressed and patient agrees to proceed with surgery on 10/19/22.     -same day admission 10/19/22  -covid test 10/17/22  -preop instructions and antibacterial soaps x 3 provided  -take metoprolol morning of surgery  CAD    Mr. Kan a 62 year old male with stable angina, positive stress test and 3-vessel CAD  presents for surgical consult. Dr. Graves reviewed the cardiac cath imaging and reports with the patient and his spouse and discussed the case with Dr.Yi-Ming Jerez.  Dr. Graves discussed the risks, benefits and alternatives to surgery. Dr. Graves performed the STS risk calculator and quoted a 2-3%  operative mortality and complication risk and  discussed the STS risk factors with the patient include but not limited to death, heart attack, bleeding, stroke, kidney problems and infection. He also discussed the various approaches, minimally invasive versus sternotomy. Dr. Graves feels the patient will benefit and is a candidate for a OPCABG. All questions were addressed and patient agrees to proceed with surgery on 10/19/22.     -same day admission 10/19/22  -covid test 10/17/22  -preop instructions and antibacterial soaps x 3 provided  -take metoprolol morning of surgery     Proceed with surgery

## 2022-10-17 NOTE — H&P ADULT - HISTORY OF PRESENT ILLNESS
62 year old Cantonese-speaking male, current smoker, with PMHx HTN, uncontrolled DM-2 (A1c 14.0) and HLD (, recently started on statin), moderate b/l carotid stenosis, presented to Dr. Jerez with c/o leg pain with ambulation and DAMON. He reports occasional chest pain but does not walk much due to DAMON and leg fatigue. Denies dizziness, palpitations, orthopnea/PND, leg swelling, LOC, bleeding, melena/hematochezia, fever, chills, URI symptoms, or recent illness. Exercise NST 9/2/22: hypotensive response to exercise with marked ST depression on EKG; inferior and inferolateral infarct with moderate edgardo-infarct ischemia; large anterior wall ischemia., TID 1.19, EF 38% TTE 8/9/22: EF 40-45%, grade 1 diastolic dysfunction, akinetic basal inferolateral, basal inferior, mid inferolateral, mid inferior, and mid inferoseptal. Dyskinetic mid anterolateral. No significant valvular disease. LE arterial duplex- no hemodynamically significant stenosis.    In light of risk factors, CCS class III anginal equivalent symptoms, and high risk NST suggestive of multivessel CAD, patient is referred for cardiac catheterization. Patient is now s/p cardiac catheterization revealing 95% mLAD, Diag1 50%, mLCx 100%  (with L-L collaterals), RCA subtotal 90% stenosis, dRCA 80% diffuse disease, EF 35-40% with mid/basal akinesis. EDP 9 mmHG.   CTS Dr. Graves consulted for CABG evaluation and patient deemed a surgical candidate. He presents for elective surgery.  62 year old Cantonese-speaking male, current smoker, with PMHx HTN, uncontrolled DM-2 (A1c 14.0) and HLD (, recently started on statin), moderate b/l carotid stenosis, presented to Dr. Jerez with c/o leg pain with ambulation and DAMON. He reports occasional chest pain but does not walk much due to DAMON and leg fatigue. Denies dizziness, palpitations, orthopnea/PND, leg swelling, LOC, bleeding, melena/hematochezia, fever, chills, URI symptoms, or recent illness. Exercise NST 9/2/22: hypotensive response to exercise with marked ST depression on EKG; inferior and inferolateral infarct with moderate edgardo-infarct ischemia; large anterior wall ischemia., TID 1.19, EF 38% TTE 8/9/22: EF 40-45%, grade 1 diastolic dysfunction, akinetic basal inferolateral, basal inferior, mid inferolateral, mid inferior, and mid inferoseptal. Dyskinetic mid anterolateral. No significant valvular disease. LE arterial duplex- no hemodynamically significant stenosis.    In light of risk factors, CCS class III anginal equivalent symptoms, and high risk NST suggestive of multivessel CAD, patient is referred for cardiac catheterization. Patient is now s/p cardiac catheterization revealing 95% mLAD, Diag1 50%, mLCx 100%  (with L-L collaterals), RCA subtotal 90% stenosis, dRCA 80% diffuse disease, EF 35-40% with mid/basal akinesis. EDP 9 mmHG.     Patient seen in same day holding area; Reports no changes to PMHx or medications since last seen by our team. Denies acute or current SOB, chest pain, palpitation, N/V/D, fever/chills, recent illness, or any other concerning symptoms. Patient took home metoprolol this morning.  Admit under Dr. Graves  via same day surgery. Consent signed, placed on chart.  Risks/benefits reviewed, patient understands and agrees. T&S ordered and blood products placed on hold for OR.  To 9  post-op.    CTS Dr. Graves consulted for CABG evaluation and patient deemed a surgical candidate. He presents for elective surgery.

## 2022-10-17 NOTE — H&P ADULT - NSICDXPASTMEDICALHX_GEN_ALL_CORE_FT
PAST MEDICAL HISTORY:  Carotid stenosis     Coronary artery disease with angina pectoris     Hyperlipidemia     Hypertension     Ischemic cardiomyopathy     Type 2 diabetes mellitus

## 2022-10-18 ENCOUNTER — TRANSCRIPTION ENCOUNTER (OUTPATIENT)
Age: 62
End: 2022-10-18

## 2022-10-18 VITALS
TEMPERATURE: 97 F | HEART RATE: 82 BPM | WEIGHT: 98.11 LBS | RESPIRATION RATE: 18 BRPM | HEIGHT: 64 IN | SYSTOLIC BLOOD PRESSURE: 150 MMHG | DIASTOLIC BLOOD PRESSURE: 77 MMHG | OXYGEN SATURATION: 100 %

## 2022-10-18 VITALS — HEIGHT: 63.94 IN | BODY MASS INDEX: 17.3 KG/M2 | WEIGHT: 100.09 LBS

## 2022-10-18 DIAGNOSIS — I50.22 CHRONIC SYSTOLIC (CONGESTIVE) HEART FAILURE: ICD-10-CM

## 2022-10-18 DIAGNOSIS — Z01.818 ENCOUNTER FOR OTHER PREPROCEDURAL EXAMINATION: ICD-10-CM

## 2022-10-18 DIAGNOSIS — I65.23 OCCLUSION AND STENOSIS OF BILATERAL CAROTID ARTERIES: ICD-10-CM

## 2022-10-18 DIAGNOSIS — Z86.39 PERSONAL HISTORY OF OTHER ENDOCRINE, NUTRITIONAL AND METABOLIC DISEASE: ICD-10-CM

## 2022-10-18 DIAGNOSIS — Z86.79 PERSONAL HISTORY OF OTHER DISEASES OF THE CIRCULATORY SYSTEM: ICD-10-CM

## 2022-10-18 DIAGNOSIS — F17.200 NICOTINE DEPENDENCE, UNSPECIFIED, UNCOMPLICATED: ICD-10-CM

## 2022-10-18 RX ORDER — INFLUENZA VIRUS VACCINE 15; 15; 15; 15 UG/.5ML; UG/.5ML; UG/.5ML; UG/.5ML
0.5 SUSPENSION INTRAMUSCULAR ONCE
Refills: 0 | Status: DISCONTINUED | OUTPATIENT
Start: 2022-10-19 | End: 2022-10-24

## 2022-10-18 NOTE — PATIENT PROFILE ADULT - FALL HARM RISK - UNIVERSAL INTERVENTIONS
Bed in lowest position, wheels locked, appropriate side rails in place/Call bell, personal items and telephone in reach/Instruct patient to call for assistance before getting out of bed or chair/Non-slip footwear when patient is out of bed/[place]ilt to call system/Physically safe environment - no spills, clutter or unnecessary equipment/Purposeful Proactive Rounding/Room/bathroom lighting operational, light cord in reach

## 2022-10-19 ENCOUNTER — TRANSCRIPTION ENCOUNTER (OUTPATIENT)
Age: 62
End: 2022-10-19

## 2022-10-19 ENCOUNTER — INPATIENT (INPATIENT)
Facility: HOSPITAL | Age: 62
LOS: 4 days | Discharge: HOME HEALTH | DRG: 235 | End: 2022-10-24
Attending: THORACIC SURGERY (CARDIOTHORACIC VASCULAR SURGERY) | Admitting: THORACIC SURGERY (CARDIOTHORACIC VASCULAR SURGERY)
Payer: COMMERCIAL

## 2022-10-19 ENCOUNTER — APPOINTMENT (OUTPATIENT)
Dept: CARDIOTHORACIC SURGERY | Facility: HOSPITAL | Age: 62
End: 2022-10-19

## 2022-10-19 DIAGNOSIS — E11.65 TYPE 2 DIABETES MELLITUS WITH HYPERGLYCEMIA: ICD-10-CM

## 2022-10-19 DIAGNOSIS — R00.1 BRADYCARDIA, UNSPECIFIED: ICD-10-CM

## 2022-10-19 DIAGNOSIS — F17.210 NICOTINE DEPENDENCE, CIGARETTES, UNCOMPLICATED: ICD-10-CM

## 2022-10-19 DIAGNOSIS — R63.6 UNDERWEIGHT: ICD-10-CM

## 2022-10-19 DIAGNOSIS — E83.42 HYPOMAGNESEMIA: ICD-10-CM

## 2022-10-19 DIAGNOSIS — E87.20 ACIDOSIS, UNSPECIFIED: ICD-10-CM

## 2022-10-19 DIAGNOSIS — I25.10 ATHEROSCLEROTIC HEART DISEASE OF NATIVE CORONARY ARTERY WITHOUT ANGINA PECTORIS: ICD-10-CM

## 2022-10-19 DIAGNOSIS — I25.82 CHRONIC TOTAL OCCLUSION OF CORONARY ARTERY: ICD-10-CM

## 2022-10-19 DIAGNOSIS — J98.11 ATELECTASIS: ICD-10-CM

## 2022-10-19 DIAGNOSIS — Z83.3 FAMILY HISTORY OF DIABETES MELLITUS: ICD-10-CM

## 2022-10-19 DIAGNOSIS — R57.1 HYPOVOLEMIC SHOCK: ICD-10-CM

## 2022-10-19 DIAGNOSIS — J90 PLEURAL EFFUSION, NOT ELSEWHERE CLASSIFIED: ICD-10-CM

## 2022-10-19 DIAGNOSIS — E78.5 HYPERLIPIDEMIA, UNSPECIFIED: ICD-10-CM

## 2022-10-19 DIAGNOSIS — Z91.199 PATIENT'S NONCOMPLIANCE WITH OTHER MEDICAL TREATMENT AND REGIMEN DUE TO UNSPECIFIED REASON: ICD-10-CM

## 2022-10-19 DIAGNOSIS — Z79.84 LONG TERM (CURRENT) USE OF ORAL HYPOGLYCEMIC DRUGS: ICD-10-CM

## 2022-10-19 DIAGNOSIS — I25.5 ISCHEMIC CARDIOMYOPATHY: ICD-10-CM

## 2022-10-19 DIAGNOSIS — I10 ESSENTIAL (PRIMARY) HYPERTENSION: ICD-10-CM

## 2022-10-19 DIAGNOSIS — K00.0 ANODONTIA: ICD-10-CM

## 2022-10-19 DIAGNOSIS — I25.118 ATHEROSCLEROTIC HEART DISEASE OF NATIVE CORONARY ARTERY WITH OTHER FORMS OF ANGINA PECTORIS: ICD-10-CM

## 2022-10-19 LAB
ALBUMIN SERPL ELPH-MCNC: 3 G/DL — LOW (ref 3.3–5)
ALBUMIN SERPL ELPH-MCNC: 4 G/DL — SIGNIFICANT CHANGE UP (ref 3.3–5)
ALP SERPL-CCNC: 43 U/L — SIGNIFICANT CHANGE UP (ref 40–120)
ALP SERPL-CCNC: 52 U/L — SIGNIFICANT CHANGE UP (ref 40–120)
ALT FLD-CCNC: 21 U/L — SIGNIFICANT CHANGE UP (ref 10–45)
ALT FLD-CCNC: 25 U/L — SIGNIFICANT CHANGE UP (ref 10–45)
ANION GAP SERPL CALC-SCNC: 11 MMOL/L — SIGNIFICANT CHANGE UP (ref 5–17)
ANION GAP SERPL CALC-SCNC: 8 MMOL/L — SIGNIFICANT CHANGE UP (ref 5–17)
APTT BLD: 39.3 SEC — HIGH (ref 27.5–35.5)
APTT BLD: 47 SEC — HIGH (ref 27.5–35.5)
AST SERPL-CCNC: 25 U/L — SIGNIFICANT CHANGE UP (ref 10–40)
AST SERPL-CCNC: 26 U/L — SIGNIFICANT CHANGE UP (ref 10–40)
BASE EXCESS BLDA CALC-SCNC: -2.6 MMOL/L — LOW (ref -2–3)
BASE EXCESS BLDA CALC-SCNC: -2.9 MMOL/L — LOW (ref -2–3)
BASE EXCESS BLDA CALC-SCNC: -3.2 MMOL/L — LOW (ref -2–3)
BASE EXCESS BLDA CALC-SCNC: -3.4 MMOL/L — LOW (ref -2–3)
BASE EXCESS BLDA CALC-SCNC: -3.5 MMOL/L — LOW (ref -2–3)
BASOPHILS # BLD AUTO: 0.03 K/UL — SIGNIFICANT CHANGE UP (ref 0–0.2)
BASOPHILS NFR BLD AUTO: 0.3 % — SIGNIFICANT CHANGE UP (ref 0–2)
BILIRUB SERPL-MCNC: 0.4 MG/DL — SIGNIFICANT CHANGE UP (ref 0.2–1.2)
BILIRUB SERPL-MCNC: 0.4 MG/DL — SIGNIFICANT CHANGE UP (ref 0.2–1.2)
BLD GP AB SCN SERPL QL: NEGATIVE — SIGNIFICANT CHANGE UP
BUN SERPL-MCNC: 35 MG/DL — HIGH (ref 7–23)
BUN SERPL-MCNC: 35 MG/DL — HIGH (ref 7–23)
CA-I BLDA-SCNC: 1.18 MMOL/L — SIGNIFICANT CHANGE UP (ref 1.15–1.33)
CA-I BLDA-SCNC: 1.19 MMOL/L — SIGNIFICANT CHANGE UP (ref 1.15–1.33)
CA-I BLDA-SCNC: 1.19 MMOL/L — SIGNIFICANT CHANGE UP (ref 1.15–1.33)
CA-I BLDA-SCNC: 1.2 MMOL/L — SIGNIFICANT CHANGE UP (ref 1.15–1.33)
CA-I BLDA-SCNC: 1.2 MMOL/L — SIGNIFICANT CHANGE UP (ref 1.15–1.33)
CALCIUM SERPL-MCNC: 7.6 MG/DL — LOW (ref 8.4–10.5)
CALCIUM SERPL-MCNC: 8 MG/DL — LOW (ref 8.4–10.5)
CHLORIDE SERPL-SCNC: 109 MMOL/L — HIGH (ref 96–108)
CHLORIDE SERPL-SCNC: 111 MMOL/L — HIGH (ref 96–108)
CO2 BLDA-SCNC: 22 MMOL/L — SIGNIFICANT CHANGE UP (ref 19–24)
CO2 BLDA-SCNC: 22 MMOL/L — SIGNIFICANT CHANGE UP (ref 19–24)
CO2 BLDA-SCNC: 23 MMOL/L — SIGNIFICANT CHANGE UP (ref 19–24)
CO2 BLDA-SCNC: 23 MMOL/L — SIGNIFICANT CHANGE UP (ref 19–24)
CO2 BLDA-SCNC: 24 MMOL/L — SIGNIFICANT CHANGE UP (ref 19–24)
CO2 SERPL-SCNC: 21 MMOL/L — LOW (ref 22–31)
CO2 SERPL-SCNC: 23 MMOL/L — SIGNIFICANT CHANGE UP (ref 22–31)
COHGB MFR BLDA: 0 % — SIGNIFICANT CHANGE UP
COHGB MFR BLDA: 0 % — SIGNIFICANT CHANGE UP
COHGB MFR BLDA: 0.1 % — SIGNIFICANT CHANGE UP
COHGB MFR BLDA: 0.2 % — SIGNIFICANT CHANGE UP
COHGB MFR BLDA: 0.3 % — SIGNIFICANT CHANGE UP
CREAT SERPL-MCNC: 1.05 MG/DL — SIGNIFICANT CHANGE UP (ref 0.5–1.3)
CREAT SERPL-MCNC: 1.09 MG/DL — SIGNIFICANT CHANGE UP (ref 0.5–1.3)
EGFR: 76 ML/MIN/1.73M2 — SIGNIFICANT CHANGE UP
EGFR: 79 ML/MIN/1.73M2 — SIGNIFICANT CHANGE UP
EOSINOPHIL # BLD AUTO: 0.15 K/UL — SIGNIFICANT CHANGE UP (ref 0–0.5)
EOSINOPHIL NFR BLD AUTO: 1.4 % — SIGNIFICANT CHANGE UP (ref 0–6)
GAS PNL BLDA: SIGNIFICANT CHANGE UP
GAS PNL BLDA: SIGNIFICANT CHANGE UP
GLUCOSE BLDA-MCNC: 125 MG/DL — HIGH (ref 70–99)
GLUCOSE BLDA-MCNC: 202 MG/DL — HIGH (ref 70–99)
GLUCOSE BLDA-MCNC: 205 MG/DL — HIGH (ref 70–99)
GLUCOSE BLDA-MCNC: 220 MG/DL — HIGH (ref 70–99)
GLUCOSE BLDA-MCNC: 235 MG/DL — HIGH (ref 70–99)
GLUCOSE BLDC GLUCOMTR-MCNC: 128 MG/DL — HIGH (ref 70–99)
GLUCOSE BLDC GLUCOMTR-MCNC: 155 MG/DL — HIGH (ref 70–99)
GLUCOSE BLDC GLUCOMTR-MCNC: 158 MG/DL — HIGH (ref 70–99)
GLUCOSE BLDC GLUCOMTR-MCNC: 173 MG/DL — HIGH (ref 70–99)
GLUCOSE BLDC GLUCOMTR-MCNC: 86 MG/DL — SIGNIFICANT CHANGE UP (ref 70–99)
GLUCOSE SERPL-MCNC: 107 MG/DL — HIGH (ref 70–99)
GLUCOSE SERPL-MCNC: 195 MG/DL — HIGH (ref 70–99)
HCO3 BLDA-SCNC: 21 MMOL/L — SIGNIFICANT CHANGE UP (ref 21–28)
HCO3 BLDA-SCNC: 21 MMOL/L — SIGNIFICANT CHANGE UP (ref 21–28)
HCO3 BLDA-SCNC: 22 MMOL/L — SIGNIFICANT CHANGE UP (ref 21–28)
HCO3 BLDA-SCNC: 22 MMOL/L — SIGNIFICANT CHANGE UP (ref 21–28)
HCO3 BLDA-SCNC: 23 MMOL/L — SIGNIFICANT CHANGE UP (ref 21–28)
HCT VFR BLD CALC: 22.4 % — LOW (ref 39–50)
HCT VFR BLD CALC: 24.5 % — LOW (ref 39–50)
HGB BLD-MCNC: 7.8 G/DL — LOW (ref 13–17)
HGB BLD-MCNC: 8.4 G/DL — LOW (ref 13–17)
HGB BLDA-MCNC: 10.2 G/DL — LOW (ref 12.6–17.4)
HGB BLDA-MCNC: 8.6 G/DL — LOW (ref 12.6–17.4)
HGB BLDA-MCNC: 9.4 G/DL — LOW (ref 12.6–17.4)
HGB BLDA-MCNC: 9.6 G/DL — LOW (ref 12.6–17.4)
HGB BLDA-MCNC: 9.7 G/DL — LOW (ref 12.6–17.4)
HOROWITZ INDEX BLDA+IHG-RTO: 100 — SIGNIFICANT CHANGE UP
IMM GRANULOCYTES NFR BLD AUTO: 0.9 % — SIGNIFICANT CHANGE UP (ref 0–0.9)
INR BLD: 1.07 — SIGNIFICANT CHANGE UP (ref 0.88–1.16)
INR BLD: 1.11 — SIGNIFICANT CHANGE UP (ref 0.88–1.16)
ISTAT ARTERIAL BE: -2 MMOL/L — SIGNIFICANT CHANGE UP (ref -2–3)
ISTAT ARTERIAL GLUCOSE: 177 MG/DL — HIGH (ref 70–99)
ISTAT ARTERIAL HCO3: 22 MMOL/L — SIGNIFICANT CHANGE UP (ref 22–26)
ISTAT ARTERIAL HEMATOCRIT: 25 % — LOW (ref 39–50)
ISTAT ARTERIAL HEMOGLOBIN: 8.5 G/DL — LOW (ref 13–17)
ISTAT ARTERIAL IONIZED CALCIUM: 1.17 MMOL/L — SIGNIFICANT CHANGE UP (ref 1.12–1.3)
ISTAT ARTERIAL PCO2: 36 MMHG — SIGNIFICANT CHANGE UP (ref 35–45)
ISTAT ARTERIAL PH: 7.4 — SIGNIFICANT CHANGE UP (ref 7.35–7.45)
ISTAT ARTERIAL PO2: 203 MMHG — HIGH (ref 80–105)
ISTAT ARTERIAL POTASSIUM: 3.7 MMOL/L — SIGNIFICANT CHANGE UP (ref 3.5–5.3)
ISTAT ARTERIAL SO2: 100 % — HIGH (ref 95–98)
ISTAT ARTERIAL SODIUM: 140 MMOL/L — SIGNIFICANT CHANGE UP (ref 135–145)
ISTAT ARTERIAL TCO2: 23 MMOL/L — SIGNIFICANT CHANGE UP (ref 22–31)
LYMPHOCYTES # BLD AUTO: 0.73 K/UL — LOW (ref 1–3.3)
LYMPHOCYTES # BLD AUTO: 6.7 % — LOW (ref 13–44)
MAGNESIUM SERPL-MCNC: 1.8 MG/DL — SIGNIFICANT CHANGE UP (ref 1.6–2.6)
MCHC RBC-ENTMCNC: 29.6 PG — SIGNIFICANT CHANGE UP (ref 27–34)
MCHC RBC-ENTMCNC: 29.9 PG — SIGNIFICANT CHANGE UP (ref 27–34)
MCHC RBC-ENTMCNC: 34.3 GM/DL — SIGNIFICANT CHANGE UP (ref 32–36)
MCHC RBC-ENTMCNC: 34.8 GM/DL — SIGNIFICANT CHANGE UP (ref 32–36)
MCV RBC AUTO: 85.8 FL — SIGNIFICANT CHANGE UP (ref 80–100)
MCV RBC AUTO: 86.3 FL — SIGNIFICANT CHANGE UP (ref 80–100)
METHGB MFR BLDA: 0.5 % — SIGNIFICANT CHANGE UP
METHGB MFR BLDA: 0.7 % — SIGNIFICANT CHANGE UP
METHGB MFR BLDA: 0.7 % — SIGNIFICANT CHANGE UP
METHGB MFR BLDA: 0.8 % — SIGNIFICANT CHANGE UP
METHGB MFR BLDA: 0.8 % — SIGNIFICANT CHANGE UP
MONOCYTES # BLD AUTO: 0.66 K/UL — SIGNIFICANT CHANGE UP (ref 0–0.9)
MONOCYTES NFR BLD AUTO: 6 % — SIGNIFICANT CHANGE UP (ref 2–14)
NEUTROPHILS # BLD AUTO: 9.24 K/UL — HIGH (ref 1.8–7.4)
NEUTROPHILS NFR BLD AUTO: 84.7 % — HIGH (ref 43–77)
NRBC # BLD: 0 /100 WBCS — SIGNIFICANT CHANGE UP (ref 0–0)
NRBC # BLD: 0 /100 WBCS — SIGNIFICANT CHANGE UP (ref 0–0)
OXYHGB MFR BLDA: 97.2 % — HIGH (ref 90–95)
OXYHGB MFR BLDA: 97.6 % — HIGH (ref 90–95)
OXYHGB MFR BLDA: 97.8 % — HIGH (ref 90–95)
PCO2 BLDA: 34 MMHG — LOW (ref 35–48)
PCO2 BLDA: 36 MMHG — SIGNIFICANT CHANGE UP (ref 35–48)
PCO2 BLDA: 37 MMHG — SIGNIFICANT CHANGE UP (ref 35–48)
PCO2 BLDA: 40 MMHG — SIGNIFICANT CHANGE UP (ref 35–48)
PCO2 BLDA: 41 MMHG — SIGNIFICANT CHANGE UP (ref 35–48)
PH BLDA: 7.34 — LOW (ref 7.35–7.45)
PH BLDA: 7.36 — SIGNIFICANT CHANGE UP (ref 7.35–7.45)
PH BLDA: 7.38 — SIGNIFICANT CHANGE UP (ref 7.35–7.45)
PH BLDA: 7.38 — SIGNIFICANT CHANGE UP (ref 7.35–7.45)
PH BLDA: 7.4 — SIGNIFICANT CHANGE UP (ref 7.35–7.45)
PHOSPHATE SERPL-MCNC: 3.6 MG/DL — SIGNIFICANT CHANGE UP (ref 2.5–4.5)
PLATELET # BLD AUTO: 144 K/UL — LOW (ref 150–400)
PLATELET # BLD AUTO: 157 K/UL — SIGNIFICANT CHANGE UP (ref 150–400)
PO2 BLDA: 465 MMHG — HIGH (ref 83–108)
PO2 BLDA: 499 MMHG — HIGH (ref 83–108)
PO2 BLDA: 510 MMHG — HIGH (ref 83–108)
PO2 BLDA: 513 MMHG — HIGH (ref 83–108)
PO2 BLDA: 558 MMHG — HIGH (ref 83–108)
POTASSIUM BLDA-SCNC: 3.7 MMOL/L — SIGNIFICANT CHANGE UP (ref 3.5–5.1)
POTASSIUM BLDA-SCNC: 3.9 MMOL/L — SIGNIFICANT CHANGE UP (ref 3.5–5.1)
POTASSIUM BLDA-SCNC: 4.1 MMOL/L — SIGNIFICANT CHANGE UP (ref 3.5–5.1)
POTASSIUM BLDA-SCNC: 4.1 MMOL/L — SIGNIFICANT CHANGE UP (ref 3.5–5.1)
POTASSIUM BLDA-SCNC: 4.3 MMOL/L — SIGNIFICANT CHANGE UP (ref 3.5–5.1)
POTASSIUM SERPL-MCNC: 4 MMOL/L — SIGNIFICANT CHANGE UP (ref 3.5–5.3)
POTASSIUM SERPL-MCNC: 4 MMOL/L — SIGNIFICANT CHANGE UP (ref 3.5–5.3)
POTASSIUM SERPL-SCNC: 4 MMOL/L — SIGNIFICANT CHANGE UP (ref 3.5–5.3)
POTASSIUM SERPL-SCNC: 4 MMOL/L — SIGNIFICANT CHANGE UP (ref 3.5–5.3)
PROT SERPL-MCNC: 4.6 G/DL — LOW (ref 6–8.3)
PROT SERPL-MCNC: 5.6 G/DL — LOW (ref 6–8.3)
PROTHROM AB SERPL-ACNC: 12.7 SEC — SIGNIFICANT CHANGE UP (ref 10.5–13.4)
PROTHROM AB SERPL-ACNC: 13.2 SEC — SIGNIFICANT CHANGE UP (ref 10.5–13.4)
RBC # BLD: 2.61 M/UL — LOW (ref 4.2–5.8)
RBC # BLD: 2.84 M/UL — LOW (ref 4.2–5.8)
RBC # FLD: 13.8 % — SIGNIFICANT CHANGE UP (ref 10.3–14.5)
RBC # FLD: 13.8 % — SIGNIFICANT CHANGE UP (ref 10.3–14.5)
RH IG SCN BLD-IMP: POSITIVE — SIGNIFICANT CHANGE UP
SAO2 % BLDA: 98 % — SIGNIFICANT CHANGE UP (ref 94–98)
SAO2 % BLDA: 98.3 % — HIGH (ref 94–98)
SAO2 % BLDA: 98.4 % — HIGH (ref 94–98)
SAO2 % BLDA: 98.5 % — HIGH (ref 94–98)
SAO2 % BLDA: 98.5 % — HIGH (ref 94–98)
SODIUM BLDA-SCNC: 135 MMOL/L — LOW (ref 136–145)
SODIUM BLDA-SCNC: 135 MMOL/L — LOW (ref 136–145)
SODIUM BLDA-SCNC: 136 MMOL/L — SIGNIFICANT CHANGE UP (ref 136–145)
SODIUM BLDA-SCNC: 136 MMOL/L — SIGNIFICANT CHANGE UP (ref 136–145)
SODIUM BLDA-SCNC: 137 MMOL/L — SIGNIFICANT CHANGE UP (ref 136–145)
SODIUM SERPL-SCNC: 140 MMOL/L — SIGNIFICANT CHANGE UP (ref 135–145)
SODIUM SERPL-SCNC: 143 MMOL/L — SIGNIFICANT CHANGE UP (ref 135–145)
WBC # BLD: 10.91 K/UL — HIGH (ref 3.8–10.5)
WBC # BLD: 11.34 K/UL — HIGH (ref 3.8–10.5)
WBC # FLD AUTO: 10.91 K/UL — HIGH (ref 3.8–10.5)
WBC # FLD AUTO: 11.34 K/UL — HIGH (ref 3.8–10.5)

## 2022-10-19 PROCEDURE — 99291 CRITICAL CARE FIRST HOUR: CPT

## 2022-10-19 PROCEDURE — 33517 CABG ARTERY-VEIN SINGLE: CPT

## 2022-10-19 PROCEDURE — 71045 X-RAY EXAM CHEST 1 VIEW: CPT | Mod: 26

## 2022-10-19 PROCEDURE — 33534 CABG ARTERIAL TWO: CPT

## 2022-10-19 PROCEDURE — 93010 ELECTROCARDIOGRAM REPORT: CPT

## 2022-10-19 PROCEDURE — 99292 CRITICAL CARE ADDL 30 MIN: CPT

## 2022-10-19 DEVICE — KIT CVC 3LUM SPECTRUM 9FR: Type: IMPLANTABLE DEVICE | Status: FUNCTIONAL

## 2022-10-19 DEVICE — CHEST DRAIN THORACIC PVC 28FR RIGHT ANGLE: Type: IMPLANTABLE DEVICE | Status: FUNCTIONAL

## 2022-10-19 DEVICE — CANNULA VESSEL 3MM BEVELED TIP RADIOPAQUE: Type: IMPLANTABLE DEVICE | Status: FUNCTIONAL

## 2022-10-19 DEVICE — SHUNT FLO-THRU INTRALUMINAL 1.25MM X 18MM: Type: IMPLANTABLE DEVICE | Status: FUNCTIONAL

## 2022-10-19 DEVICE — LIGATING CLIPS WECK HORIZON SMALL-WIDE (RED) 24: Type: IMPLANTABLE DEVICE | Status: FUNCTIONAL

## 2022-10-19 DEVICE — SHUNT FLO-THRU INTRALUMINAL1.75MM X 18MM: Type: IMPLANTABLE DEVICE | Status: FUNCTIONAL

## 2022-10-19 DEVICE — PACING WIRE STREAMLINE BIPOLAR MYOCARDIAL: Type: IMPLANTABLE DEVICE | Status: FUNCTIONAL

## 2022-10-19 RX ORDER — DEXTROSE 50 % IN WATER 50 %
25 SYRINGE (ML) INTRAVENOUS
Refills: 0 | Status: DISCONTINUED | OUTPATIENT
Start: 2022-10-19 | End: 2022-10-20

## 2022-10-19 RX ORDER — PROPOFOL 10 MG/ML
5 INJECTION, EMULSION INTRAVENOUS
Qty: 1000 | Refills: 0 | Status: DISCONTINUED | OUTPATIENT
Start: 2022-10-19 | End: 2022-10-19

## 2022-10-19 RX ORDER — ACETAMINOPHEN 500 MG
650 TABLET ORAL EVERY 6 HOURS
Refills: 0 | Status: DISCONTINUED | OUTPATIENT
Start: 2022-10-19 | End: 2022-10-24

## 2022-10-19 RX ORDER — PANTOPRAZOLE SODIUM 20 MG/1
40 TABLET, DELAYED RELEASE ORAL DAILY
Refills: 0 | Status: DISCONTINUED | OUTPATIENT
Start: 2022-10-19 | End: 2022-10-19

## 2022-10-19 RX ORDER — FENTANYL CITRATE 50 UG/ML
25 INJECTION INTRAVENOUS
Refills: 0 | Status: DISCONTINUED | OUTPATIENT
Start: 2022-10-19 | End: 2022-10-20

## 2022-10-19 RX ORDER — INSULIN HUMAN 100 [IU]/ML
2 INJECTION, SOLUTION SUBCUTANEOUS
Qty: 50 | Refills: 0 | Status: DISCONTINUED | OUTPATIENT
Start: 2022-10-19 | End: 2022-10-20

## 2022-10-19 RX ORDER — DEXMEDETOMIDINE HYDROCHLORIDE IN 0.9% SODIUM CHLORIDE 4 UG/ML
0.2 INJECTION INTRAVENOUS
Qty: 200 | Refills: 0 | Status: DISCONTINUED | OUTPATIENT
Start: 2022-10-19 | End: 2022-10-19

## 2022-10-19 RX ORDER — NOREPINEPHRINE BITARTRATE/D5W 8 MG/250ML
0.03 PLASTIC BAG, INJECTION (ML) INTRAVENOUS
Qty: 8 | Refills: 0 | Status: DISCONTINUED | OUTPATIENT
Start: 2022-10-19 | End: 2022-10-20

## 2022-10-19 RX ORDER — CALCIUM GLUCONATE 100 MG/ML
2 VIAL (ML) INTRAVENOUS ONCE
Refills: 0 | Status: COMPLETED | OUTPATIENT
Start: 2022-10-19 | End: 2022-10-20

## 2022-10-19 RX ORDER — ACETAMINOPHEN 500 MG
675 TABLET ORAL ONCE
Refills: 0 | Status: COMPLETED | OUTPATIENT
Start: 2022-10-19 | End: 2022-10-19

## 2022-10-19 RX ORDER — CLOPIDOGREL BISULFATE 75 MG/1
75 TABLET, FILM COATED ORAL DAILY
Refills: 0 | Status: DISCONTINUED | OUTPATIENT
Start: 2022-10-19 | End: 2022-10-24

## 2022-10-19 RX ORDER — SODIUM CHLORIDE 9 MG/ML
1000 INJECTION, SOLUTION INTRAVENOUS ONCE
Refills: 0 | Status: COMPLETED | OUTPATIENT
Start: 2022-10-19 | End: 2022-10-19

## 2022-10-19 RX ORDER — PIOGLITAZONE HYDROCHLORIDE 15 MG/1
1 TABLET ORAL
Qty: 0 | Refills: 0 | DISCHARGE

## 2022-10-19 RX ORDER — POLYETHYLENE GLYCOL 3350 17 G/17G
17 POWDER, FOR SOLUTION ORAL DAILY
Refills: 0 | Status: DISCONTINUED | OUTPATIENT
Start: 2022-10-19 | End: 2022-10-24

## 2022-10-19 RX ORDER — DEXTROSE 50 % IN WATER 50 %
50 SYRINGE (ML) INTRAVENOUS
Refills: 0 | Status: DISCONTINUED | OUTPATIENT
Start: 2022-10-19 | End: 2022-10-20

## 2022-10-19 RX ORDER — HEPARIN SODIUM 5000 [USP'U]/ML
2500 INJECTION INTRAVENOUS; SUBCUTANEOUS EVERY 12 HOURS
Refills: 0 | Status: DISCONTINUED | OUTPATIENT
Start: 2022-10-19 | End: 2022-10-19

## 2022-10-19 RX ORDER — CHLORHEXIDINE GLUCONATE 213 G/1000ML
15 SOLUTION TOPICAL EVERY 12 HOURS
Refills: 0 | Status: DISCONTINUED | OUTPATIENT
Start: 2022-10-19 | End: 2022-10-19

## 2022-10-19 RX ORDER — CEFAZOLIN SODIUM 1 G
2000 VIAL (EA) INJECTION EVERY 8 HOURS
Refills: 0 | Status: COMPLETED | OUTPATIENT
Start: 2022-10-19 | End: 2022-10-21

## 2022-10-19 RX ORDER — SODIUM CHLORIDE 9 MG/ML
1000 INJECTION INTRAMUSCULAR; INTRAVENOUS; SUBCUTANEOUS
Refills: 0 | Status: DISCONTINUED | OUTPATIENT
Start: 2022-10-19 | End: 2022-10-24

## 2022-10-19 RX ORDER — OXYCODONE HYDROCHLORIDE 5 MG/1
10 TABLET ORAL EVERY 6 HOURS
Refills: 0 | Status: DISCONTINUED | OUTPATIENT
Start: 2022-10-19 | End: 2022-10-24

## 2022-10-19 RX ORDER — METFORMIN HYDROCHLORIDE 850 MG/1
1 TABLET ORAL
Qty: 0 | Refills: 0 | DISCHARGE

## 2022-10-19 RX ORDER — ALBUMIN HUMAN 25 %
500 VIAL (ML) INTRAVENOUS ONCE
Refills: 0 | Status: COMPLETED | OUTPATIENT
Start: 2022-10-19 | End: 2022-10-19

## 2022-10-19 RX ORDER — PANTOPRAZOLE SODIUM 20 MG/1
40 TABLET, DELAYED RELEASE ORAL
Refills: 0 | Status: DISCONTINUED | OUTPATIENT
Start: 2022-10-19 | End: 2022-10-24

## 2022-10-19 RX ORDER — ASPIRIN/CALCIUM CARB/MAGNESIUM 324 MG
81 TABLET ORAL DAILY
Refills: 0 | Status: DISCONTINUED | OUTPATIENT
Start: 2022-10-19 | End: 2022-10-24

## 2022-10-19 RX ORDER — ALBUMIN HUMAN 25 %
250 VIAL (ML) INTRAVENOUS ONCE
Refills: 0 | Status: COMPLETED | OUTPATIENT
Start: 2022-10-19 | End: 2022-10-19

## 2022-10-19 RX ORDER — HEPARIN SODIUM 5000 [USP'U]/ML
5000 INJECTION INTRAVENOUS; SUBCUTANEOUS EVERY 12 HOURS
Refills: 0 | Status: DISCONTINUED | OUTPATIENT
Start: 2022-10-19 | End: 2022-10-24

## 2022-10-19 RX ORDER — VASOPRESSIN 20 [USP'U]/ML
0.03 INJECTION INTRAVENOUS
Qty: 40 | Refills: 0 | Status: DISCONTINUED | OUTPATIENT
Start: 2022-10-19 | End: 2022-10-20

## 2022-10-19 RX ORDER — ATORVASTATIN CALCIUM 80 MG/1
40 TABLET, FILM COATED ORAL AT BEDTIME
Refills: 0 | Status: DISCONTINUED | OUTPATIENT
Start: 2022-10-19 | End: 2022-10-24

## 2022-10-19 RX ORDER — CHLORHEXIDINE GLUCONATE 213 G/1000ML
1 SOLUTION TOPICAL DAILY
Refills: 0 | Status: DISCONTINUED | OUTPATIENT
Start: 2022-10-19 | End: 2022-10-24

## 2022-10-19 RX ORDER — METOPROLOL TARTRATE 50 MG
1 TABLET ORAL
Qty: 0 | Refills: 0 | DISCHARGE

## 2022-10-19 RX ORDER — OXYCODONE HYDROCHLORIDE 5 MG/1
5 TABLET ORAL EVERY 4 HOURS
Refills: 0 | Status: DISCONTINUED | OUTPATIENT
Start: 2022-10-19 | End: 2022-10-24

## 2022-10-19 RX ORDER — ATORVASTATIN CALCIUM 80 MG/1
1 TABLET, FILM COATED ORAL
Qty: 0 | Refills: 0 | DISCHARGE

## 2022-10-19 RX ORDER — MAGNESIUM SULFATE 500 MG/ML
2 VIAL (ML) INJECTION ONCE
Refills: 0 | Status: COMPLETED | OUTPATIENT
Start: 2022-10-19 | End: 2022-10-20

## 2022-10-19 RX ADMIN — Medication 100 MILLIGRAM(S): at 22:59

## 2022-10-19 RX ADMIN — Medication 250 MILLILITER(S): at 20:00

## 2022-10-19 RX ADMIN — SODIUM CHLORIDE 500 MILLILITER(S): 9 INJECTION, SOLUTION INTRAVENOUS at 21:50

## 2022-10-19 RX ADMIN — Medication 125 MILLILITER(S): at 19:48

## 2022-10-19 RX ADMIN — Medication 675 MILLIGRAM(S): at 23:14

## 2022-10-19 RX ADMIN — PROPOFOL 1.34 MICROGRAM(S)/KG/MIN: 10 INJECTION, EMULSION INTRAVENOUS at 19:47

## 2022-10-19 RX ADMIN — Medication 125 MILLILITER(S): at 19:49

## 2022-10-19 RX ADMIN — Medication 2.75 MICROGRAM(S)/KG/MIN: at 19:47

## 2022-10-19 RX ADMIN — INSULIN HUMAN 2 UNIT(S)/HR: 100 INJECTION, SOLUTION SUBCUTANEOUS at 19:47

## 2022-10-19 RX ADMIN — Medication 270 MILLIGRAM(S): at 22:59

## 2022-10-19 RX ADMIN — DEXMEDETOMIDINE HYDROCHLORIDE IN 0.9% SODIUM CHLORIDE 2.23 MICROGRAM(S)/KG/HR: 4 INJECTION INTRAVENOUS at 19:47

## 2022-10-19 NOTE — PROGRESS NOTE ADULT - SUBJECTIVE AND OBJECTIVE BOX
CTICU  CRITICAL  CARE  attending     Hand off received 					   Pertinent clinical, laboratory, radiographic, hemodynamic, echocardiographic, respiratory data, microbiologic data and chart were reviewed and analyzed frequently throughout the course of the day  Patient seen and examined with CTS/ SH attending at bedside  Pt is a 62yr old male with PMH HTN, DM, HLD, bl carotid stenosis, admitted for multivessel CAD.  Sp OPCABG with Dr. Graves 10/19.      FAMILY HISTORY:  PAST MEDICAL & SURGICAL HISTORY:  Ischemic cardiomyopathy  Hypertension  Hyperlipidemia  Type 2 diabetes mellitus  Carotid stenosis  Coronary artery disease with angina pectoris  No significant past surgical history        Patient is a 62y old  Male who presents with a chief complaint of CAD.      14 system review was unremarkable    Vital signs, hemodynamic and respiratory parameters were reviewed from the bedside nursing flowsheet.  ICU Vital Signs Last 24 Hrs  T(C): 36.1 (19 Oct 2022 21:08), Max: 36.1 (19 Oct 2022 21:08)  T(F): 97 (19 Oct 2022 21:08), Max: 97 (19 Oct 2022 21:08)  HR: 95 (19 Oct 2022 22:30) (65 - 95)  BP: 84/55 (19 Oct 2022 18:20) (84/55 - 84/55)  BP(mean): 66 (19 Oct 2022 18:20) (66 - 66)  ABP: 138/70 (19 Oct 2022 22:30) (89/52 - 138/70)  ABP(mean): 97 (19 Oct 2022 22:30) (63 - 97)  RR: 19 (19 Oct 2022 22:30) (10 - 25)  SpO2: 100% (19 Oct 2022 22:30) (100% - 100%)    O2 Parameters below as of 19 Oct 2022 23:00  Patient On (Oxygen Delivery Method): mask, aerosol          Adult Advanced Hemodynamics Last 24 Hrs  CVP(mm Hg): 53 (19 Oct 2022 22:30) (0 - 53)  CVP(cm H2O): --  CO: --  CI: --  PA: --  PA(mean): --  PCWP: --  SVR: --  SVRI: --  PVR: --  PVRI: --, ABG - ( 19 Oct 2022 22:03 )  pH, Arterial: 7.35  pH, Blood: x     /  pCO2: 35    /  pO2: 193   / HCO3: 19    / Base Excess: -5.6  /  SaO2: 98.3              Mode: AC/ CMV (Assist Control/ Continuous Mandatory Ventilation)  RR (machine): 10  TV (machine): 450  FiO2: 50  PEEP: 5  ITime: 1  MAP: 7  PIP: 14    Intake and output was reviewed and the fluid balance was calculated  Daily Height in cm: 162.56 (19 Oct 2022 11:44)    Daily   I&O's Summary    19 Oct 2022 07:01  -  19 Oct 2022 23:09  --------------------------------------------------------  IN: 1350.9 mL / OUT: 482 mL / NET: 868.9 mL        All lines and drain sites were assessed  Glycemic trend was reviewed    POCT Blood Glucose.: 86 mg/dL (19 Oct 2022 22:07)      Neuro: intubated, sedated  HEENT:mmm  Heart: s1 s2  Lungs: ctabl  Abdomen: soft nt nd  Extremities: 2+dp    Lines:      labs  CBC Full  -  ( 19 Oct 2022 21:40 )  WBC Count : 11.34 K/uL  RBC Count : 2.61 M/uL  Hemoglobin : 7.8 g/dL  Hematocrit : 22.4 %  Platelet Count - Automated : 157 K/uL  Mean Cell Volume : 85.8 fl  Mean Cell Hemoglobin : 29.9 pg  Mean Cell Hemoglobin Concentration : 34.8 gm/dL  Auto Neutrophil # : x  Auto Lymphocyte # : x  Auto Monocyte # : x  Auto Eosinophil # : x  Auto Basophil # : x  Auto Neutrophil % : x  Auto Lymphocyte % : x  Auto Monocyte % : x  Auto Eosinophil % : x  Auto Basophil % : x    10-19    143  |  111<H>  |  35<H>  ----------------------------<  107<H>  4.0   |  21<L>  |  1.09    Ca    8.0<L>      19 Oct 2022 21:40  Phos  3.6     10-19  Mg     1.8     10-19    TPro  5.6<L>  /  Alb  4.0  /  TBili  0.4  /  DBili  x   /  AST  25  /  ALT  21  /  AlkPhos  43  10-19    PT/INR - ( 19 Oct 2022 21:40 )   PT: 12.7 sec;   INR: 1.07          PTT - ( 19 Oct 2022 21:40 )  PTT:47.0 sec  The current medications were reviewed   MEDICATIONS  (STANDING):  aspirin enteric coated 81 milliGRAM(s) Oral daily  atorvastatin 40 milliGRAM(s) Oral at bedtime  calcium gluconate IVPB 2 Gram(s) IV Intermittent once  ceFAZolin   IVPB 2000 milliGRAM(s) IV Intermittent every 8 hours  chlorhexidine 2% Cloths 1 Application(s) Topical daily  clopidogrel Tablet 75 milliGRAM(s) Oral daily  dextrose 50% Injectable 50 milliLiter(s) IV Push every 15 minutes  dextrose 50% Injectable 25 milliLiter(s) IV Push every 15 minutes  heparin   Injectable 5000 Unit(s) SubCutaneous every 12 hours  influenza   Vaccine 0.5 milliLiter(s) IntraMuscular once  insulin regular Infusion 2 Unit(s)/Hr (2 mL/Hr) IV Continuous <Continuous>  lactated ringers Bolus 1000 milliLiter(s) IV Bolus once  magnesium sulfate  IVPB 2 Gram(s) IV Intermittent once  norepinephrine Infusion 0.033 MICROgram(s)/kG/Min (2.75 mL/Hr) IV Continuous <Continuous>  pantoprazole    Tablet 40 milliGRAM(s) Oral before breakfast  polyethylene glycol 3350 17 Gram(s) Oral daily  sodium chloride 0.9%. 1000 milliLiter(s) (10 mL/Hr) IV Continuous <Continuous>  vasopressin Infusion 0.033 Unit(s)/Min (4.95 mL/Hr) IV Continuous <Continuous>    MEDICATIONS  (PRN):  acetaminophen     Tablet .. 650 milliGRAM(s) Oral every 6 hours PRN Mild Pain (1 - 3)  fentaNYL    Injectable 25 MICROGram(s) IV Push every 3 hours PRN Severe Pain (7 - 10)  oxyCODONE    IR 5 milliGRAM(s) Oral every 4 hours PRN Moderate Pain (4 - 6)  oxyCODONE    IR 10 milliGRAM(s) Oral every 6 hours PRN Severe Pain (7 - 10)      Assessment/Plan:  62yr old male with PMH HTN, DM, HLD, bl carotid stenosis, admitted for multivessel CAD.    Sp OPCABG with Dr. Graves 10/19  Acute respiratory failure requiring mechanical ventilation-wean to extubate  fu post op labs  monitor CT output  post op cxr  aspirin  statin  bblocker prn  Replete lytes prn  GI/DVT PPX  Bowel Regimen  Pain control  Close hemodynamic, ventilatory and drain monitoring and management per post op routine  Monitor for arrhythmias and monitor parameters for organ perfusion  Beta blockade not administered due to hemodynamic instability and bradycardia  Monitor neurologic status  Head of the bed should remain elevated to 45 deg   Chest PT and IS will be encouraged  Monitor adequacy of oxygenation and ventilation and attempt to wean oxygen  Antibiotic regimen will be tailored to the clinical, laboratory and microbiologic data  Nutritional goals will be met using po eventually, ensure adequate caloric intake and monitor the same  Stress ulcer and VTE prophylaxis will be achieved    Glycemic control is satisfactory  Electrolytes have been repleted as necessary and wound care has been carried out   Pain control has been achieved.   Aggressive physical therapy and early mobility and ambulation goals will be met   The family was updated about the course and plan.    CRITICAL CARE TIME personally provided by me  in evaluation and management, reassessments, review and interpretation of labs and x-rays, ventilator and hemodynamic management, formulating a plan and coordinating care: ___30____ MIN.  Time does not include procedural time.    CTICU ATTENDING     					  Faustina Wilder MD

## 2022-10-19 NOTE — BRIEF OPERATIVE NOTE - COMMENTS
I first assisted for the entirety of the case, including but not limited to conduit harvest, distal/proximal anastomoses, and chest closure. Dr. Jace Rodriguez first assisted for the entirety of the case, including but not limited to conduit harvest, distal/proximal anastomoses, and chest closure.

## 2022-10-19 NOTE — BRIEF OPERATIVE NOTE - OPERATION/FINDINGS
EF    Radial artery harvest time:  Radial artery prep time:    GSV harvest time:  GSV prep time: EF 40-45%    Radial artery harvest time: 30 min  Radial artery prep time: 10 min    GSV harvest time:30 min  GSV prep time:10 min     Proximal: Side biting clamp

## 2022-10-19 NOTE — PROGRESS NOTE ADULT - SUBJECTIVE AND OBJECTIVE BOX
CTICU  CRITICAL  CARE  attending     Hand off received 					   Pertinent clinical, laboratory, radiographic, hemodynamic, echocardiographic, respiratory data, microbiologic data and chart were reviewed and analyzed frequently throughout the course of the day and night  Patient seen and examined with CTS/ SH attending at bedside  Pt is a 62y , Male, HEALTH ISSUES - PROBLEM Dx:      , FAMILY HISTORY:  PAST MEDICAL & SURGICAL HISTORY:  Ischemic cardiomyopathy      Hypertension      Hyperlipidemia      Type 2 diabetes mellitus      Carotid stenosis      Coronary artery disease with angina pectoris      No significant past surgical history        Patient is a 62y old  Male who presents with a chief complaint of CAD (17 Oct 2022 09:29)      14 system review limited by mentation and multiorgan morbidity     Vital signs, hemodynamic and respiratory parameters were reviewed from the bedside nursing flowsheet.  ICU Vital Signs Last 24 Hrs  T(C): --  T(F): --  HR: 67 (19 Oct 2022 20:00) (65 - 69)  BP: 84/55 (19 Oct 2022 18:20) (84/55 - 84/55)  BP(mean): 66 (19 Oct 2022 18:20) (66 - 66)  ABP: 109/54 (19 Oct 2022 20:00) (89/52 - 127/61)  ABP(mean): 72 (19 Oct 2022 20:00) (64 - 86)  RR: 25 (19 Oct 2022 20:00) (10 - 25)  SpO2: 100% (19 Oct 2022 20:00) (100% - 100%)    O2 Parameters below as of 19 Oct 2022 20:00  Patient On (Oxygen Delivery Method): ventilator    O2 Concentration (%): 40      Adult Advanced Hemodynamics Last 24 Hrs  CVP(mm Hg): 5 (19 Oct 2022 20:00) (0 - 19)  CVP(cm H2O): --  CO: --  CI: --  PA: --  PA(mean): --  PCWP: --  SVR: --  SVRI: --  PVR: --  PVRI: --, ABG - ( 19 Oct 2022 17:59 )  pH, Arterial: 7.39  pH, Blood: x     /  pCO2: 34    /  pO2: 192   / HCO3: 21    / Base Excess: -3.6  /  SaO2: 98.9              Mode: AC/ CMV (Assist Control/ Continuous Mandatory Ventilation)  RR (machine): 10  TV (machine): 450  FiO2: 50  PEEP: 5  ITime: 1  MAP: 7  PIP: 14    Intake and output was reviewed and the fluid balance was calculated  Daily Height in cm: 162.56 (19 Oct 2022 11:44)    Daily   I&O's Summary    19 Oct 2022 07:01  -  19 Oct 2022 20:34  --------------------------------------------------------  IN: 824.6 mL / OUT: 377 mL / NET: 447.6 mL        All lines and drain sites were assessed  Glycemic trend was reviewedCAPILLARY BLOOD GLUCOSE      POCT Blood Glucose.: 155 mg/dL (19 Oct 2022 20:09)    No acute change in focality  Auscultation of the chest reveals equal bs  Abdomen is soft  Extremities are warm and well perfused  Wounds appear clean and unremarkable  Antibiotics are periop    labs  CBC Full  -  ( 19 Oct 2022 18:01 )  WBC Count : 10.91 K/uL  RBC Count : 2.84 M/uL  Hemoglobin : 8.4 g/dL  Hematocrit : 24.5 %  Platelet Count - Automated : 144 K/uL  Mean Cell Volume : 86.3 fl  Mean Cell Hemoglobin : 29.6 pg  Mean Cell Hemoglobin Concentration : 34.3 gm/dL  Auto Neutrophil # : 9.24 K/uL  Auto Lymphocyte # : 0.73 K/uL  Auto Monocyte # : 0.66 K/uL  Auto Eosinophil # : 0.15 K/uL  Auto Basophil # : 0.03 K/uL  Auto Neutrophil % : 84.7 %  Auto Lymphocyte % : 6.7 %  Auto Monocyte % : 6.0 %  Auto Eosinophil % : 1.4 %  Auto Basophil % : 0.3 %    10-19    140  |  109<H>  |  35<H>  ----------------------------<  195<H>  4.0   |  23  |  1.05    Ca    7.6<L>      19 Oct 2022 18:01    TPro  4.6<L>  /  Alb  3.0<L>  /  TBili  0.4  /  DBili  x   /  AST  26  /  ALT  25  /  AlkPhos  52  10-19    PT/INR - ( 19 Oct 2022 18:01 )   PT: 13.2 sec;   INR: 1.11          PTT - ( 19 Oct 2022 18:01 )  PTT:39.3 sec  The current medications were reviewed   MEDICATIONS  (STANDING):  acetaminophen   IVPB .. 675 milliGRAM(s) IV Intermittent once  aspirin enteric coated 81 milliGRAM(s) Oral daily  atorvastatin 40 milliGRAM(s) Oral at bedtime  ceFAZolin   IVPB 2000 milliGRAM(s) IV Intermittent every 8 hours  chlorhexidine 0.12% Liquid 15 milliLiter(s) Oral Mucosa every 12 hours  chlorhexidine 2% Cloths 1 Application(s) Topical daily  clopidogrel Tablet 75 milliGRAM(s) Oral daily  dexMEDEtomidine Infusion 0.2 MICROgram(s)/kG/Hr (2.23 mL/Hr) IV Continuous <Continuous>  dextrose 50% Injectable 50 milliLiter(s) IV Push every 15 minutes  dextrose 50% Injectable 25 milliLiter(s) IV Push every 15 minutes  heparin   Injectable 2500 Unit(s) SubCutaneous every 12 hours  influenza   Vaccine 0.5 milliLiter(s) IntraMuscular once  insulin regular Infusion 2 Unit(s)/Hr (2 mL/Hr) IV Continuous <Continuous>  norepinephrine Infusion 0.033 MICROgram(s)/kG/Min (2.75 mL/Hr) IV Continuous <Continuous>  pantoprazole  Injectable 40 milliGRAM(s) IV Push daily  propofol Infusion 5 MICROgram(s)/kG/Min (1.34 mL/Hr) IV Continuous <Continuous>  sodium chloride 0.9%. 1000 milliLiter(s) (10 mL/Hr) IV Continuous <Continuous>  vasopressin Infusion 0.033 Unit(s)/Min (4.95 mL/Hr) IV Continuous <Continuous>    MEDICATIONS  (PRN):  fentaNYL    Injectable 25 MICROGram(s) IV Push every 3 hours PRN Severe Pain (7 - 10)       PROBLEM LIST/ ASSESSMENT:  HEALTH ISSUES - PROBLEM Dx:      ,   Patient is a 62y old  Male who presents with a chief complaint of CAD (17 Oct 2022 09:29)     s/p cardiac surgery                My plan includes :  close hemodynamic, ventilatory and drain monitoring and management per post op routine    Monitor for arrhythmias and monitor parameters for organ perfusion  beta blockade not administered due to hemodynamic instability and bradycardia  monitor neurologic status  Head of the bed should remain elevated to 45 deg .   chest PT and IS will be encouraged  monitor adequacy of oxygenation and ventilation and attempt to wean oxygen  antibiotic regimen will be tailored to the clinical, laboratory and microbiologic data  Nutritional goals will be met using po eventually , ensure adequate caloric intake and montior the same  Stress ulcer and VTE prophylaxis will be achieved    Glycemic control is satisfactory  Electrolytes have been repleted as necessary and wound care has been carried out. Pain control has been achieved.   agressive physical therapy and early mobility and ambulation goals will be met   The family was updated about the course and plan  CRITICAL CARE TIME personally provided by me  in evaluation and management, reassessments, review and interpretation of labs and x-rays, ventilator and hemodynamic management, formulating a plan and coordinating care: ___90____ MIN.  Time does not include procedural time. Time spent was non routine post-operarive caRE and included multiple and repeated evaluations at the bedside  CTICU ATTENDING     					    Bert Clayton MD

## 2022-10-20 LAB
ALBUMIN SERPL ELPH-MCNC: 4 G/DL — SIGNIFICANT CHANGE UP (ref 3.3–5)
ALBUMIN SERPL ELPH-MCNC: 4 G/DL — SIGNIFICANT CHANGE UP (ref 3.3–5)
ALP SERPL-CCNC: 46 U/L — SIGNIFICANT CHANGE UP (ref 40–120)
ALP SERPL-CCNC: 47 U/L — SIGNIFICANT CHANGE UP (ref 40–120)
ALT FLD-CCNC: 18 U/L — SIGNIFICANT CHANGE UP (ref 10–45)
ALT FLD-CCNC: 20 U/L — SIGNIFICANT CHANGE UP (ref 10–45)
ANION GAP SERPL CALC-SCNC: 12 MMOL/L — SIGNIFICANT CHANGE UP (ref 5–17)
ANION GAP SERPL CALC-SCNC: 12 MMOL/L — SIGNIFICANT CHANGE UP (ref 5–17)
APTT BLD: 30.6 SEC — SIGNIFICANT CHANGE UP (ref 27.5–35.5)
AST SERPL-CCNC: 24 U/L — SIGNIFICANT CHANGE UP (ref 10–40)
AST SERPL-CCNC: 26 U/L — SIGNIFICANT CHANGE UP (ref 10–40)
BILIRUB SERPL-MCNC: 0.5 MG/DL — SIGNIFICANT CHANGE UP (ref 0.2–1.2)
BILIRUB SERPL-MCNC: 0.5 MG/DL — SIGNIFICANT CHANGE UP (ref 0.2–1.2)
BUN SERPL-MCNC: 33 MG/DL — HIGH (ref 7–23)
BUN SERPL-MCNC: 35 MG/DL — HIGH (ref 7–23)
CALCIUM SERPL-MCNC: 8.7 MG/DL — SIGNIFICANT CHANGE UP (ref 8.4–10.5)
CALCIUM SERPL-MCNC: 8.8 MG/DL — SIGNIFICANT CHANGE UP (ref 8.4–10.5)
CHLORIDE SERPL-SCNC: 104 MMOL/L — SIGNIFICANT CHANGE UP (ref 96–108)
CHLORIDE SERPL-SCNC: 107 MMOL/L — SIGNIFICANT CHANGE UP (ref 96–108)
CO2 SERPL-SCNC: 19 MMOL/L — LOW (ref 22–31)
CO2 SERPL-SCNC: 19 MMOL/L — LOW (ref 22–31)
CREAT SERPL-MCNC: 1.05 MG/DL — SIGNIFICANT CHANGE UP (ref 0.5–1.3)
CREAT SERPL-MCNC: 1.31 MG/DL — HIGH (ref 0.5–1.3)
EGFR: 61 ML/MIN/1.73M2 — SIGNIFICANT CHANGE UP
EGFR: 79 ML/MIN/1.73M2 — SIGNIFICANT CHANGE UP
GAS PNL BLDA: SIGNIFICANT CHANGE UP
GLUCOSE BLDC GLUCOMTR-MCNC: 153 MG/DL — HIGH (ref 70–99)
GLUCOSE BLDC GLUCOMTR-MCNC: 163 MG/DL — HIGH (ref 70–99)
GLUCOSE BLDC GLUCOMTR-MCNC: 178 MG/DL — HIGH (ref 70–99)
GLUCOSE BLDC GLUCOMTR-MCNC: 212 MG/DL — HIGH (ref 70–99)
GLUCOSE SERPL-MCNC: 117 MG/DL — HIGH (ref 70–99)
GLUCOSE SERPL-MCNC: 204 MG/DL — HIGH (ref 70–99)
HCT VFR BLD CALC: 22.8 % — LOW (ref 39–50)
HCT VFR BLD CALC: 22.9 % — LOW (ref 39–50)
HGB BLD-MCNC: 7.7 G/DL — LOW (ref 13–17)
HGB BLD-MCNC: 7.8 G/DL — LOW (ref 13–17)
INR BLD: 1 — SIGNIFICANT CHANGE UP (ref 0.88–1.16)
MAGNESIUM SERPL-MCNC: 2.3 MG/DL — SIGNIFICANT CHANGE UP (ref 1.6–2.6)
MAGNESIUM SERPL-MCNC: 2.4 MG/DL — SIGNIFICANT CHANGE UP (ref 1.6–2.6)
MCHC RBC-ENTMCNC: 29.1 PG — SIGNIFICANT CHANGE UP (ref 27–34)
MCHC RBC-ENTMCNC: 29.3 PG — SIGNIFICANT CHANGE UP (ref 27–34)
MCHC RBC-ENTMCNC: 33.6 GM/DL — SIGNIFICANT CHANGE UP (ref 32–36)
MCHC RBC-ENTMCNC: 34.2 GM/DL — SIGNIFICANT CHANGE UP (ref 32–36)
MCV RBC AUTO: 85.7 FL — SIGNIFICANT CHANGE UP (ref 80–100)
MCV RBC AUTO: 86.4 FL — SIGNIFICANT CHANGE UP (ref 80–100)
NRBC # BLD: 0 /100 WBCS — SIGNIFICANT CHANGE UP (ref 0–0)
NRBC # BLD: 0 /100 WBCS — SIGNIFICANT CHANGE UP (ref 0–0)
PHOSPHATE SERPL-MCNC: 3.9 MG/DL — SIGNIFICANT CHANGE UP (ref 2.5–4.5)
PHOSPHATE SERPL-MCNC: 4.3 MG/DL — SIGNIFICANT CHANGE UP (ref 2.5–4.5)
PLATELET # BLD AUTO: 167 K/UL — SIGNIFICANT CHANGE UP (ref 150–400)
PLATELET # BLD AUTO: 172 K/UL — SIGNIFICANT CHANGE UP (ref 150–400)
POTASSIUM SERPL-MCNC: 4 MMOL/L — SIGNIFICANT CHANGE UP (ref 3.5–5.3)
POTASSIUM SERPL-MCNC: 4.4 MMOL/L — SIGNIFICANT CHANGE UP (ref 3.5–5.3)
POTASSIUM SERPL-SCNC: 4 MMOL/L — SIGNIFICANT CHANGE UP (ref 3.5–5.3)
POTASSIUM SERPL-SCNC: 4.4 MMOL/L — SIGNIFICANT CHANGE UP (ref 3.5–5.3)
PROT SERPL-MCNC: 5.3 G/DL — LOW (ref 6–8.3)
PROT SERPL-MCNC: 5.6 G/DL — LOW (ref 6–8.3)
PROTHROM AB SERPL-ACNC: 11.9 SEC — SIGNIFICANT CHANGE UP (ref 10.5–13.4)
RBC # BLD: 2.65 M/UL — LOW (ref 4.2–5.8)
RBC # BLD: 2.66 M/UL — LOW (ref 4.2–5.8)
RBC # FLD: 13.9 % — SIGNIFICANT CHANGE UP (ref 10.3–14.5)
RBC # FLD: 14.2 % — SIGNIFICANT CHANGE UP (ref 10.3–14.5)
SODIUM SERPL-SCNC: 135 MMOL/L — SIGNIFICANT CHANGE UP (ref 135–145)
SODIUM SERPL-SCNC: 138 MMOL/L — SIGNIFICANT CHANGE UP (ref 135–145)
WBC # BLD: 10.76 K/UL — HIGH (ref 3.8–10.5)
WBC # BLD: 9.74 K/UL — SIGNIFICANT CHANGE UP (ref 3.8–10.5)
WBC # FLD AUTO: 10.76 K/UL — HIGH (ref 3.8–10.5)
WBC # FLD AUTO: 9.74 K/UL — SIGNIFICANT CHANGE UP (ref 3.8–10.5)

## 2022-10-20 PROCEDURE — 71045 X-RAY EXAM CHEST 1 VIEW: CPT | Mod: 26

## 2022-10-20 PROCEDURE — 71045 X-RAY EXAM CHEST 1 VIEW: CPT | Mod: 26,77

## 2022-10-20 PROCEDURE — 99291 CRITICAL CARE FIRST HOUR: CPT

## 2022-10-20 RX ORDER — METOPROLOL TARTRATE 50 MG
12.5 TABLET ORAL EVERY 12 HOURS
Refills: 0 | Status: DISCONTINUED | OUTPATIENT
Start: 2022-10-20 | End: 2022-10-24

## 2022-10-20 RX ORDER — GLUCAGON INJECTION, SOLUTION 0.5 MG/.1ML
1 INJECTION, SOLUTION SUBCUTANEOUS ONCE
Refills: 0 | Status: DISCONTINUED | OUTPATIENT
Start: 2022-10-20 | End: 2022-10-24

## 2022-10-20 RX ORDER — DEXTROSE 50 % IN WATER 50 %
25 SYRINGE (ML) INTRAVENOUS ONCE
Refills: 0 | Status: DISCONTINUED | OUTPATIENT
Start: 2022-10-20 | End: 2022-10-24

## 2022-10-20 RX ORDER — INSULIN LISPRO 100/ML
VIAL (ML) SUBCUTANEOUS
Refills: 0 | Status: DISCONTINUED | OUTPATIENT
Start: 2022-10-20 | End: 2022-10-24

## 2022-10-20 RX ORDER — DEXTROSE 50 % IN WATER 50 %
15 SYRINGE (ML) INTRAVENOUS ONCE
Refills: 0 | Status: DISCONTINUED | OUTPATIENT
Start: 2022-10-20 | End: 2022-10-24

## 2022-10-20 RX ORDER — NICARDIPINE HYDROCHLORIDE 30 MG/1
5 CAPSULE, EXTENDED RELEASE ORAL
Qty: 40 | Refills: 0 | Status: DISCONTINUED | OUTPATIENT
Start: 2022-10-20 | End: 2022-10-20

## 2022-10-20 RX ORDER — SODIUM CHLORIDE 9 MG/ML
1000 INJECTION, SOLUTION INTRAVENOUS
Refills: 0 | Status: DISCONTINUED | OUTPATIENT
Start: 2022-10-20 | End: 2022-10-24

## 2022-10-20 RX ORDER — ALBUMIN HUMAN 25 %
500 VIAL (ML) INTRAVENOUS ONCE
Refills: 0 | Status: COMPLETED | OUTPATIENT
Start: 2022-10-20 | End: 2022-10-20

## 2022-10-20 RX ORDER — DEXTROSE 50 % IN WATER 50 %
12.5 SYRINGE (ML) INTRAVENOUS ONCE
Refills: 0 | Status: DISCONTINUED | OUTPATIENT
Start: 2022-10-20 | End: 2022-10-24

## 2022-10-20 RX ADMIN — Medication 2: at 17:35

## 2022-10-20 RX ADMIN — PANTOPRAZOLE SODIUM 40 MILLIGRAM(S): 20 TABLET, DELAYED RELEASE ORAL at 07:49

## 2022-10-20 RX ADMIN — CLOPIDOGREL BISULFATE 75 MILLIGRAM(S): 75 TABLET, FILM COATED ORAL at 11:58

## 2022-10-20 RX ADMIN — Medication 250 MILLILITER(S): at 20:46

## 2022-10-20 RX ADMIN — Medication 100 MILLIGRAM(S): at 05:28

## 2022-10-20 RX ADMIN — Medication 100 MILLIGRAM(S): at 21:54

## 2022-10-20 RX ADMIN — Medication 2: at 07:50

## 2022-10-20 RX ADMIN — OXYCODONE HYDROCHLORIDE 10 MILLIGRAM(S): 5 TABLET ORAL at 16:51

## 2022-10-20 RX ADMIN — Medication 650 MILLIGRAM(S): at 06:02

## 2022-10-20 RX ADMIN — OXYCODONE HYDROCHLORIDE 10 MILLIGRAM(S): 5 TABLET ORAL at 22:35

## 2022-10-20 RX ADMIN — OXYCODONE HYDROCHLORIDE 10 MILLIGRAM(S): 5 TABLET ORAL at 23:35

## 2022-10-20 RX ADMIN — Medication 650 MILLIGRAM(S): at 07:02

## 2022-10-20 RX ADMIN — Medication 12.5 MILLIGRAM(S): at 09:53

## 2022-10-20 RX ADMIN — Medication 100 MILLIGRAM(S): at 15:23

## 2022-10-20 RX ADMIN — OXYCODONE HYDROCHLORIDE 5 MILLIGRAM(S): 5 TABLET ORAL at 12:50

## 2022-10-20 RX ADMIN — Medication 200 GRAM(S): at 00:44

## 2022-10-20 RX ADMIN — ATORVASTATIN CALCIUM 40 MILLIGRAM(S): 80 TABLET, FILM COATED ORAL at 21:54

## 2022-10-20 RX ADMIN — HEPARIN SODIUM 5000 UNIT(S): 5000 INJECTION INTRAVENOUS; SUBCUTANEOUS at 17:34

## 2022-10-20 RX ADMIN — Medication 25 GRAM(S): at 00:01

## 2022-10-20 RX ADMIN — Medication 2: at 11:57

## 2022-10-20 RX ADMIN — HEPARIN SODIUM 5000 UNIT(S): 5000 INJECTION INTRAVENOUS; SUBCUTANEOUS at 05:30

## 2022-10-20 RX ADMIN — Medication 81 MILLIGRAM(S): at 11:57

## 2022-10-20 RX ADMIN — NICARDIPINE HYDROCHLORIDE 25 MG/HR: 30 CAPSULE, EXTENDED RELEASE ORAL at 05:29

## 2022-10-20 RX ADMIN — POLYETHYLENE GLYCOL 3350 17 GRAM(S): 17 POWDER, FOR SOLUTION ORAL at 11:57

## 2022-10-20 RX ADMIN — Medication 4: at 21:54

## 2022-10-20 RX ADMIN — OXYCODONE HYDROCHLORIDE 5 MILLIGRAM(S): 5 TABLET ORAL at 11:58

## 2022-10-20 RX ADMIN — OXYCODONE HYDROCHLORIDE 10 MILLIGRAM(S): 5 TABLET ORAL at 17:33

## 2022-10-20 RX ADMIN — Medication 12.5 MILLIGRAM(S): at 17:36

## 2022-10-20 NOTE — DIETITIAN INITIAL EVALUATION ADULT - PERTINENT LABORATORY DATA
10-20    135  |  104  |  35<H>  ----------------------------<  204<H>  4.4   |  19<L>  |  1.31<H>    Ca    8.8      20 Oct 2022 11:37  Phos  4.3     10-20  Mg     2.3     10-20    TPro  5.6<L>  /  Alb  4.0  /  TBili  0.5  /  DBili  x   /  AST  24  /  ALT  18  /  AlkPhos  47  10-20  POCT Blood Glucose.: 163 mg/dL (10-20-22 @ 11:53)  A1C with Estimated Average Glucose Result: 8.9 % (10-03-22 @ 11:10)

## 2022-10-20 NOTE — DIETITIAN INITIAL EVALUATION ADULT - NSFNSGIIOFT_GEN_A_CORE
10-19-22 @ 07:01  -  10-20-22 @ 07:00  --------------------------------------------------------  OUT:    Chest Tube (mL): 292 mL    Nasogastric/Oral tube (mL): 0 mL  Total OUT: 292 mL    Total NET: -292 mL      10-20-22 @ 07:01  -  10-20-22 @ 13:30  --------------------------------------------------------  OUT:    Chest Tube (mL): 30 mL  Total OUT: 30 mL    Total NET: -30 mL

## 2022-10-20 NOTE — PROGRESS NOTE ADULT - ASSESSMENT
62 M w/PMHx of HTN and type II DM on oral meds presenting with stable angina following positive stress test and 3vCAD for surgical intervention.  S/p OPCAB (SVG and left Radial grafts) EF ~ 45%  10/19    ASSESSMENT/PLAN  Close hemodynamic, ventilatory and drain mgmt per post-op routine  Monitor for arrhythmias and parameter for organ perfusion   Cont DAPT/Statin for CAD- started on low dose bb  Currently in Sinus/borderline-low Bps with marginal UOP/ well resuscitated w/additional 2.5 L of volume post-op   Rising Cr and worsening of acidosis w/reported orthostasis- Unknown baseline Cr/ presented at 1.4 with no acidosis  Consider fluid challenge- Obtain urine studies and volumize meanwhile-- monitor UOP closely   HCT 23% from 33% pre-op/ Drains with high outputs from overnight slowing down now --> will monitor drainage   ADAT/ glycemic control satisfactory    Ppx: Sq Hep/PPI/HOB 45  OOB and mobilization as tolerated/Is and BPH    ATTENDING: I have personally and independently provided 60 Min of critical care services. This excludes time spent on procedures or teaching.

## 2022-10-20 NOTE — DIETITIAN NUTRITION RISK NOTIFICATION - ADDITIONAL COMMENTS/DIETITIAN RECOMMENDATIONS
Pt seen for length of stay initial assessment. See dietitian initial evaluation for additional information.

## 2022-10-20 NOTE — DIETITIAN INITIAL EVALUATION ADULT - OTHER INFO
Mr. Kan a 62 year old male with stable angina, positive stress test and 3-vessel CAD  presents for surgical consult. Dr. Graves reviewed the cardiac cath imaging and reports with the patient and his spouse and discussed the case with Dr.Yi-Ming Jerez. Now s/p CABG w/ SARITHA 10/19.      Pt seen at bedside for initial assessment- services used (Gladice: ID [id number]). On room air, off all drips. No n/v/d/c documented at this time. No bowel movement documented since admission. Confirms NKFA. Denies change in appetite PTA; endorses 3 meals/day at home (reports following regular diet at home). Reports usual body weight 98 pounds (consistent with dosing weight 98 pounds). Reports weight has been stable. No edema documented at this time. No pressure ulcers documented at this time. Surgical incision per chart. Roberto score=18. Labs reviewed 10/20; noted w/ elevated BUN/Cr. Fingersticks 10/19-10/20:  mg/dL; insulin regimen ordered. Observed pt with no overt signs of muscle or fat wasting. Based on ASPEN guidelines, pt does not meet criteria for malnutrition at this time. Provided pt with diet education regarding importance of meeting nutritional needs post operatively ; amenable to education. Discussed current diet order CTSCHO Low sodium diet; provided diet education, discussed sources of high versus low fat, types of CHO and low sodium foods. Pt reports feeling hungry during hospital stay, able to complete >50% of meals. No cultural, ethnic, Mu-ism food preferences noted. Made aware RD remains available. RD to follow up. See nutrition recommendations below.

## 2022-10-20 NOTE — DIETITIAN INITIAL EVALUATION ADULT - OTHER CALCULATIONS
Based on Standards of Care pt <% IBW thus actual body weight used for all calculations. Needs adjusted for post op.

## 2022-10-20 NOTE — PROGRESS NOTE ADULT - SUBJECTIVE AND OBJECTIVE BOX
INTERVAL COURSE/OVERNIGHT EVENTS  S/p OpCABx3 (SVG and Left Radial) EF 40-45%  10/19  extubated uneventfully/ on no infusions       VITALS  Vital Signs Last 24 Hrs  T(C): 37.3 (20 Oct 2022 08:57), Max: 37.3 (20 Oct 2022 08:57)  T(F): 99.1 (20 Oct 2022 08:57), Max: 99.1 (20 Oct 2022 08:57)  HR: 89 (20 Oct 2022 12:00) (60 - 99)  BP: 140/90 (20 Oct 2022 01:00) (84/55 - 140/90)  BP(mean): 110 (20 Oct 2022 01:00) (66 - 110)  RR: 19 (20 Oct 2022 12:00) (10 - 25)  SpO2: 97% (20 Oct 2022 12:00) (93% - 100%)    Parameters below as of 20 Oct 2022 12:00  Patient On (Oxygen Delivery Method): room air    PHYSICAL EXAM  General: A&Ox 3; NAD  Respiratory: left basilar crackles otherwise clear   Cardiovascular: Regular rhythm/rate   Gastrointestinal: Soft; NTND  Extremities: WWP; no edema  Neurological:  CNII-XII grossly intact; no obvious focal deficits    MEDICATIONS  (STANDING):  aspirin enteric coated 81 milliGRAM(s) Oral daily  atorvastatin 40 milliGRAM(s) Oral at bedtime  ceFAZolin   IVPB 2000 milliGRAM(s) IV Intermittent every 8 hours  chlorhexidine 2% Cloths 1 Application(s) Topical daily  clopidogrel Tablet 75 milliGRAM(s) Oral daily  dextrose 5%. 1000 milliLiter(s) (50 mL/Hr) IV Continuous <Continuous>  dextrose 5%. 1000 milliLiter(s) (100 mL/Hr) IV Continuous <Continuous>  dextrose 50% Injectable 25 Gram(s) IV Push once  dextrose 50% Injectable 12.5 Gram(s) IV Push once  dextrose 50% Injectable 25 Gram(s) IV Push once  glucagon  Injectable 1 milliGRAM(s) IntraMuscular once  heparin   Injectable 5000 Unit(s) SubCutaneous every 12 hours  influenza   Vaccine 0.5 milliLiter(s) IntraMuscular once  insulin lispro (ADMELOG) corrective regimen sliding scale   SubCutaneous Before meals and at bedtime  metoprolol tartrate 12.5 milliGRAM(s) Oral every 12 hours  pantoprazole    Tablet 40 milliGRAM(s) Oral before breakfast  polyethylene glycol 3350 17 Gram(s) Oral daily  sodium chloride 0.9%. 1000 milliLiter(s) (10 mL/Hr) IV Continuous <Continuous>    MEDICATIONS  (PRN):  acetaminophen     Tablet .. 650 milliGRAM(s) Oral every 6 hours PRN Mild Pain (1 - 3)  dextrose Oral Gel 15 Gram(s) Oral once PRN Blood Glucose LESS THAN 70 milliGRAM(s)/deciliter  fentaNYL    Injectable 25 MICROGram(s) IV Push every 3 hours PRN Severe Pain (7 - 10)  oxyCODONE    IR 5 milliGRAM(s) Oral every 4 hours PRN Moderate Pain (4 - 6)  oxyCODONE    IR 10 milliGRAM(s) Oral every 6 hours PRN Severe Pain (7 - 10)      LABS                        7.8    9.74  )-----------( 172      ( 20 Oct 2022 11:37 )             22.8     10-20    135  |  104  |  35<H>  ----------------------------<  204<H>  4.4   |  19<L>  |  1.31<H>    Ca    8.8      20 Oct 2022 11:37  Phos  4.3     10-20  Mg     2.3     10-20    TPro  5.6<L>  /  Alb  4.0  /  TBili  0.5  /  DBili  x   /  AST  24  /  ALT  18  /  AlkPhos  47  10-20    LIVER FUNCTIONS - ( 20 Oct 2022 11:37 )  Alb: 4.0 g/dL / Pro: 5.6 g/dL / ALK PHOS: 47 U/L / ALT: 18 U/L / AST: 24 U/L / GGT: x           PT/INR - ( 20 Oct 2022 01:55 )   PT: 11.9 sec;   INR: 1.00          PTT - ( 20 Oct 2022 01:55 )  PTT:30.6 sec    IMAGING/EKG/ETC  Reviewed.

## 2022-10-20 NOTE — DIETITIAN INITIAL EVALUATION ADULT - PERTINENT MEDS FT
MEDICATIONS  (STANDING):  aspirin enteric coated 81 milliGRAM(s) Oral daily  atorvastatin 40 milliGRAM(s) Oral at bedtime  ceFAZolin   IVPB 2000 milliGRAM(s) IV Intermittent every 8 hours  chlorhexidine 2% Cloths 1 Application(s) Topical daily  clopidogrel Tablet 75 milliGRAM(s) Oral daily  dextrose 5%. 1000 milliLiter(s) (50 mL/Hr) IV Continuous <Continuous>  dextrose 5%. 1000 milliLiter(s) (100 mL/Hr) IV Continuous <Continuous>  dextrose 50% Injectable 25 Gram(s) IV Push once  dextrose 50% Injectable 12.5 Gram(s) IV Push once  dextrose 50% Injectable 25 Gram(s) IV Push once  glucagon  Injectable 1 milliGRAM(s) IntraMuscular once  heparin   Injectable 5000 Unit(s) SubCutaneous every 12 hours  influenza   Vaccine 0.5 milliLiter(s) IntraMuscular once  insulin lispro (ADMELOG) corrective regimen sliding scale   SubCutaneous Before meals and at bedtime  metoprolol tartrate 12.5 milliGRAM(s) Oral every 12 hours  pantoprazole    Tablet 40 milliGRAM(s) Oral before breakfast  polyethylene glycol 3350 17 Gram(s) Oral daily  sodium chloride 0.9%. 1000 milliLiter(s) (10 mL/Hr) IV Continuous <Continuous>    MEDICATIONS  (PRN):  acetaminophen     Tablet .. 650 milliGRAM(s) Oral every 6 hours PRN Mild Pain (1 - 3)  dextrose Oral Gel 15 Gram(s) Oral once PRN Blood Glucose LESS THAN 70 milliGRAM(s)/deciliter  fentaNYL    Injectable 25 MICROGram(s) IV Push every 3 hours PRN Severe Pain (7 - 10)  oxyCODONE    IR 5 milliGRAM(s) Oral every 4 hours PRN Moderate Pain (4 - 6)  oxyCODONE    IR 10 milliGRAM(s) Oral every 6 hours PRN Severe Pain (7 - 10)

## 2022-10-20 NOTE — DIETITIAN INITIAL EVALUATION ADULT - ADD RECOMMEND
1. Continue with current diet order (monitor need for ONS) 2. Encourage pt to meet nutritional needs as able 3. Encourage adherence to diet education (reinforce as able) 4. Pain and bowel regimen per team 5. Will continue to assess/honor preferences as able 6. BMI sticker placed

## 2022-10-20 NOTE — CHART NOTE - NSCHARTNOTEFT_GEN_A_CORE
L pleural chest tube was removed on POD. No air leak prior to removal. Post-CXR without signs of pneumothorax.

## 2022-10-21 LAB
ALBUMIN SERPL ELPH-MCNC: 3.4 G/DL — SIGNIFICANT CHANGE UP (ref 3.3–5)
ALBUMIN SERPL ELPH-MCNC: 3.6 G/DL — SIGNIFICANT CHANGE UP (ref 3.3–5)
ALP SERPL-CCNC: 50 U/L — SIGNIFICANT CHANGE UP (ref 40–120)
ALP SERPL-CCNC: 50 U/L — SIGNIFICANT CHANGE UP (ref 40–120)
ALT FLD-CCNC: 13 U/L — SIGNIFICANT CHANGE UP (ref 10–45)
ALT FLD-CCNC: 17 U/L — SIGNIFICANT CHANGE UP (ref 10–45)
ANION GAP SERPL CALC-SCNC: 13 MMOL/L — SIGNIFICANT CHANGE UP (ref 5–17)
ANION GAP SERPL CALC-SCNC: 14 MMOL/L — SIGNIFICANT CHANGE UP (ref 5–17)
APTT BLD: 35.8 SEC — HIGH (ref 27.5–35.5)
AST SERPL-CCNC: 21 U/L — SIGNIFICANT CHANGE UP (ref 10–40)
AST SERPL-CCNC: 26 U/L — SIGNIFICANT CHANGE UP (ref 10–40)
BILIRUB SERPL-MCNC: 0.6 MG/DL — SIGNIFICANT CHANGE UP (ref 0.2–1.2)
BILIRUB SERPL-MCNC: 0.7 MG/DL — SIGNIFICANT CHANGE UP (ref 0.2–1.2)
BUN SERPL-MCNC: 40 MG/DL — HIGH (ref 7–23)
BUN SERPL-MCNC: 42 MG/DL — HIGH (ref 7–23)
CALCIUM SERPL-MCNC: 8.5 MG/DL — SIGNIFICANT CHANGE UP (ref 8.4–10.5)
CALCIUM SERPL-MCNC: 9 MG/DL — SIGNIFICANT CHANGE UP (ref 8.4–10.5)
CHLORIDE SERPL-SCNC: 104 MMOL/L — SIGNIFICANT CHANGE UP (ref 96–108)
CHLORIDE SERPL-SCNC: 105 MMOL/L — SIGNIFICANT CHANGE UP (ref 96–108)
CO2 SERPL-SCNC: 18 MMOL/L — LOW (ref 22–31)
CO2 SERPL-SCNC: 20 MMOL/L — LOW (ref 22–31)
CREAT SERPL-MCNC: 1.45 MG/DL — HIGH (ref 0.5–1.3)
CREAT SERPL-MCNC: 1.51 MG/DL — HIGH (ref 0.5–1.3)
EGFR: 51 ML/MIN/1.73M2 — LOW
EGFR: 54 ML/MIN/1.73M2 — LOW
GAS PNL BLDA: SIGNIFICANT CHANGE UP
GLUCOSE BLDC GLUCOMTR-MCNC: 198 MG/DL — HIGH (ref 70–99)
GLUCOSE BLDC GLUCOMTR-MCNC: 202 MG/DL — HIGH (ref 70–99)
GLUCOSE BLDC GLUCOMTR-MCNC: 228 MG/DL — HIGH (ref 70–99)
GLUCOSE BLDC GLUCOMTR-MCNC: 235 MG/DL — HIGH (ref 70–99)
GLUCOSE SERPL-MCNC: 202 MG/DL — HIGH (ref 70–99)
GLUCOSE SERPL-MCNC: 218 MG/DL — HIGH (ref 70–99)
HCT VFR BLD CALC: 20 % — CRITICAL LOW (ref 39–50)
HCT VFR BLD CALC: 20.6 % — CRITICAL LOW (ref 39–50)
HCT VFR BLD CALC: 20.6 % — CRITICAL LOW (ref 39–50)
HCT VFR BLD CALC: 22.1 % — LOW (ref 39–50)
HGB BLD-MCNC: 6.7 G/DL — CRITICAL LOW (ref 13–17)
HGB BLD-MCNC: 7.1 G/DL — LOW (ref 13–17)
HGB BLD-MCNC: 7.1 G/DL — LOW (ref 13–17)
HGB BLD-MCNC: 7.4 G/DL — LOW (ref 13–17)
INR BLD: 1.04 — SIGNIFICANT CHANGE UP (ref 0.88–1.16)
MAGNESIUM SERPL-MCNC: 2 MG/DL — SIGNIFICANT CHANGE UP (ref 1.6–2.6)
MCHC RBC-ENTMCNC: 29.3 PG — SIGNIFICANT CHANGE UP (ref 27–34)
MCHC RBC-ENTMCNC: 29.4 PG — SIGNIFICANT CHANGE UP (ref 27–34)
MCHC RBC-ENTMCNC: 29.5 PG — SIGNIFICANT CHANGE UP (ref 27–34)
MCHC RBC-ENTMCNC: 29.7 PG — SIGNIFICANT CHANGE UP (ref 27–34)
MCHC RBC-ENTMCNC: 33.5 GM/DL — SIGNIFICANT CHANGE UP (ref 32–36)
MCHC RBC-ENTMCNC: 33.5 GM/DL — SIGNIFICANT CHANGE UP (ref 32–36)
MCHC RBC-ENTMCNC: 34.5 GM/DL — SIGNIFICANT CHANGE UP (ref 32–36)
MCHC RBC-ENTMCNC: 34.5 GM/DL — SIGNIFICANT CHANGE UP (ref 32–36)
MCV RBC AUTO: 85.5 FL — SIGNIFICANT CHANGE UP (ref 80–100)
MCV RBC AUTO: 86.2 FL — SIGNIFICANT CHANGE UP (ref 80–100)
MCV RBC AUTO: 87.3 FL — SIGNIFICANT CHANGE UP (ref 80–100)
MCV RBC AUTO: 87.7 FL — SIGNIFICANT CHANGE UP (ref 80–100)
NRBC # BLD: 0 /100 WBCS — SIGNIFICANT CHANGE UP (ref 0–0)
PHOSPHATE SERPL-MCNC: 2.7 MG/DL — SIGNIFICANT CHANGE UP (ref 2.5–4.5)
PLATELET # BLD AUTO: 147 K/UL — LOW (ref 150–400)
PLATELET # BLD AUTO: 159 K/UL — SIGNIFICANT CHANGE UP (ref 150–400)
PLATELET # BLD AUTO: 165 K/UL — SIGNIFICANT CHANGE UP (ref 150–400)
PLATELET # BLD AUTO: 174 K/UL — SIGNIFICANT CHANGE UP (ref 150–400)
POTASSIUM SERPL-MCNC: 4 MMOL/L — SIGNIFICANT CHANGE UP (ref 3.5–5.3)
POTASSIUM SERPL-MCNC: 4.1 MMOL/L — SIGNIFICANT CHANGE UP (ref 3.5–5.3)
POTASSIUM SERPL-SCNC: 4 MMOL/L — SIGNIFICANT CHANGE UP (ref 3.5–5.3)
POTASSIUM SERPL-SCNC: 4.1 MMOL/L — SIGNIFICANT CHANGE UP (ref 3.5–5.3)
PROT SERPL-MCNC: 5.6 G/DL — LOW (ref 6–8.3)
PROT SERPL-MCNC: 5.7 G/DL — LOW (ref 6–8.3)
PROTHROM AB SERPL-ACNC: 12.4 SEC — SIGNIFICANT CHANGE UP (ref 10.5–13.4)
RBC # BLD: 2.29 M/UL — LOW (ref 4.2–5.8)
RBC # BLD: 2.39 M/UL — LOW (ref 4.2–5.8)
RBC # BLD: 2.41 M/UL — LOW (ref 4.2–5.8)
RBC # BLD: 2.52 M/UL — LOW (ref 4.2–5.8)
RBC # FLD: 14.5 % — SIGNIFICANT CHANGE UP (ref 10.3–14.5)
RBC # FLD: 14.6 % — HIGH (ref 10.3–14.5)
SODIUM SERPL-SCNC: 137 MMOL/L — SIGNIFICANT CHANGE UP (ref 135–145)
SODIUM SERPL-SCNC: 137 MMOL/L — SIGNIFICANT CHANGE UP (ref 135–145)
WBC # BLD: 10.6 K/UL — HIGH (ref 3.8–10.5)
WBC # BLD: 11.98 K/UL — HIGH (ref 3.8–10.5)
WBC # BLD: 12.21 K/UL — HIGH (ref 3.8–10.5)
WBC # BLD: 9.37 K/UL — SIGNIFICANT CHANGE UP (ref 3.8–10.5)
WBC # FLD AUTO: 10.6 K/UL — HIGH (ref 3.8–10.5)
WBC # FLD AUTO: 11.98 K/UL — HIGH (ref 3.8–10.5)
WBC # FLD AUTO: 12.21 K/UL — HIGH (ref 3.8–10.5)
WBC # FLD AUTO: 9.37 K/UL — SIGNIFICANT CHANGE UP (ref 3.8–10.5)

## 2022-10-21 PROCEDURE — 71045 X-RAY EXAM CHEST 1 VIEW: CPT | Mod: 26

## 2022-10-21 PROCEDURE — 99222 1ST HOSP IP/OBS MODERATE 55: CPT | Mod: GC

## 2022-10-21 PROCEDURE — 71045 X-RAY EXAM CHEST 1 VIEW: CPT | Mod: 26,77

## 2022-10-21 RX ORDER — INSULIN LISPRO 100/ML
3 VIAL (ML) SUBCUTANEOUS
Refills: 0 | Status: DISCONTINUED | OUTPATIENT
Start: 2022-10-21 | End: 2022-10-22

## 2022-10-21 RX ORDER — SODIUM CHLORIDE 9 MG/ML
1000 INJECTION, SOLUTION INTRAVENOUS
Refills: 0 | Status: DISCONTINUED | OUTPATIENT
Start: 2022-10-21 | End: 2022-10-24

## 2022-10-21 RX ORDER — DEXTROSE 50 % IN WATER 50 %
15 SYRINGE (ML) INTRAVENOUS ONCE
Refills: 0 | Status: DISCONTINUED | OUTPATIENT
Start: 2022-10-21 | End: 2022-10-24

## 2022-10-21 RX ORDER — DEXTROSE 50 % IN WATER 50 %
12.5 SYRINGE (ML) INTRAVENOUS ONCE
Refills: 0 | Status: DISCONTINUED | OUTPATIENT
Start: 2022-10-21 | End: 2022-10-24

## 2022-10-21 RX ORDER — DEXTROSE 50 % IN WATER 50 %
25 SYRINGE (ML) INTRAVENOUS ONCE
Refills: 0 | Status: DISCONTINUED | OUTPATIENT
Start: 2022-10-21 | End: 2022-10-24

## 2022-10-21 RX ORDER — SODIUM CHLORIDE 9 MG/ML
3 INJECTION INTRAMUSCULAR; INTRAVENOUS; SUBCUTANEOUS EVERY 8 HOURS
Refills: 0 | Status: DISCONTINUED | OUTPATIENT
Start: 2022-10-21 | End: 2022-10-24

## 2022-10-21 RX ORDER — AMLODIPINE BESYLATE 2.5 MG/1
2.5 TABLET ORAL DAILY
Refills: 0 | Status: DISCONTINUED | OUTPATIENT
Start: 2022-10-21 | End: 2022-10-24

## 2022-10-21 RX ORDER — LEVALBUTEROL 1.25 MG/.5ML
0.63 SOLUTION, CONCENTRATE RESPIRATORY (INHALATION) EVERY 8 HOURS
Refills: 0 | Status: DISCONTINUED | OUTPATIENT
Start: 2022-10-21 | End: 2022-10-24

## 2022-10-21 RX ORDER — GLUCAGON INJECTION, SOLUTION 0.5 MG/.1ML
1 INJECTION, SOLUTION SUBCUTANEOUS ONCE
Refills: 0 | Status: DISCONTINUED | OUTPATIENT
Start: 2022-10-21 | End: 2022-10-24

## 2022-10-21 RX ORDER — INSULIN GLARGINE 100 [IU]/ML
9 INJECTION, SOLUTION SUBCUTANEOUS AT BEDTIME
Refills: 0 | Status: DISCONTINUED | OUTPATIENT
Start: 2022-10-21 | End: 2022-10-22

## 2022-10-21 RX ORDER — SENNA PLUS 8.6 MG/1
2 TABLET ORAL AT BEDTIME
Refills: 0 | Status: DISCONTINUED | OUTPATIENT
Start: 2022-10-21 | End: 2022-10-24

## 2022-10-21 RX ORDER — BENZOCAINE AND MENTHOL 5; 1 G/100ML; G/100ML
1 LIQUID ORAL
Refills: 0 | Status: DISCONTINUED | OUTPATIENT
Start: 2022-10-21 | End: 2022-10-24

## 2022-10-21 RX ORDER — ACETAMINOPHEN 500 MG
675 TABLET ORAL ONCE
Refills: 0 | Status: COMPLETED | OUTPATIENT
Start: 2022-10-21 | End: 2022-10-21

## 2022-10-21 RX ADMIN — Medication 12.5 MILLIGRAM(S): at 19:15

## 2022-10-21 RX ADMIN — INSULIN GLARGINE 9 UNIT(S): 100 INJECTION, SOLUTION SUBCUTANEOUS at 21:39

## 2022-10-21 RX ADMIN — Medication 4: at 17:37

## 2022-10-21 RX ADMIN — Medication 100 MILLIGRAM(S): at 06:53

## 2022-10-21 RX ADMIN — SODIUM CHLORIDE 3 MILLILITER(S): 9 INJECTION INTRAMUSCULAR; INTRAVENOUS; SUBCUTANEOUS at 21:45

## 2022-10-21 RX ADMIN — SODIUM CHLORIDE 3 MILLILITER(S): 9 INJECTION INTRAMUSCULAR; INTRAVENOUS; SUBCUTANEOUS at 14:00

## 2022-10-21 RX ADMIN — CLOPIDOGREL BISULFATE 75 MILLIGRAM(S): 75 TABLET, FILM COATED ORAL at 12:06

## 2022-10-21 RX ADMIN — Medication 2: at 21:39

## 2022-10-21 RX ADMIN — Medication 650 MILLIGRAM(S): at 22:10

## 2022-10-21 RX ADMIN — Medication 3 UNIT(S): at 12:05

## 2022-10-21 RX ADMIN — Medication 4: at 07:29

## 2022-10-21 RX ADMIN — ATORVASTATIN CALCIUM 40 MILLIGRAM(S): 80 TABLET, FILM COATED ORAL at 21:40

## 2022-10-21 RX ADMIN — HEPARIN SODIUM 5000 UNIT(S): 5000 INJECTION INTRAVENOUS; SUBCUTANEOUS at 17:30

## 2022-10-21 RX ADMIN — Medication 675 MILLIGRAM(S): at 15:15

## 2022-10-21 RX ADMIN — Medication 12.5 MILLIGRAM(S): at 06:52

## 2022-10-21 RX ADMIN — POLYETHYLENE GLYCOL 3350 17 GRAM(S): 17 POWDER, FOR SOLUTION ORAL at 12:06

## 2022-10-21 RX ADMIN — HEPARIN SODIUM 5000 UNIT(S): 5000 INJECTION INTRAVENOUS; SUBCUTANEOUS at 06:52

## 2022-10-21 RX ADMIN — Medication 270 MILLIGRAM(S): at 15:00

## 2022-10-21 RX ADMIN — Medication 81 MILLIGRAM(S): at 12:06

## 2022-10-21 RX ADMIN — Medication 3 UNIT(S): at 17:36

## 2022-10-21 RX ADMIN — LEVALBUTEROL 0.63 MILLIGRAM(S): 1.25 SOLUTION, CONCENTRATE RESPIRATORY (INHALATION) at 14:57

## 2022-10-21 RX ADMIN — Medication 4: at 12:05

## 2022-10-21 RX ADMIN — Medication 650 MILLIGRAM(S): at 21:40

## 2022-10-21 RX ADMIN — CHLORHEXIDINE GLUCONATE 1 APPLICATION(S): 213 SOLUTION TOPICAL at 12:08

## 2022-10-21 RX ADMIN — PANTOPRAZOLE SODIUM 40 MILLIGRAM(S): 20 TABLET, DELAYED RELEASE ORAL at 06:52

## 2022-10-21 RX ADMIN — SENNA PLUS 2 TABLET(S): 8.6 TABLET ORAL at 21:40

## 2022-10-21 NOTE — PHYSICAL THERAPY INITIAL EVALUATION ADULT - LIVES WITH, PROFILE
One week follow up   There are no preventive care reminders to display for this patient.    Patient is up to date, no discussion needed.             spouse

## 2022-10-21 NOTE — PROVIDER CONTACT NOTE (CRITICAL VALUE NOTIFICATION) - ACTION/TREATMENT ORDERED:
Cara MCGHEE notified of HCT 20.6 and HGB 7.1, pt currently asymptomatic, HR  with -150s, pt denies lightheadedness/dizziness. awaiting new orders at this time. Cara MCGHEE notified of HCT 20.6 and HGB 7.1, pt denies lightheadedness/dizziness, HR , -150s, SpO2 90-95% while on 4LNC.  awaiting new orders at this time.

## 2022-10-21 NOTE — CHART NOTE - NSCHARTNOTEFT_GEN_A_CORE
CT Removal:    Pt seen and examined at bedside.  Case discussed with Dr. Jace Rodriguez and is recommending removal of Gene x2.  Minimal output from [place].  No air leak appreciated.  Gene removed without incident.  Occlusive dressing placed. Follow up CXR with no obvious PTX noted.  Pt tolerated procedure well and remained hemodynamically stable.

## 2022-10-21 NOTE — PHYSICAL THERAPY INITIAL EVALUATION ADULT - GENERAL OBSERVATIONS, REHAB EVAL
As per VAL Mccormick patient cleared for PT. received sitting in a chair + tyelemetry, temporary pacemaker, central line, oxygen 6LNC, aleida x 1, in NAD.

## 2022-10-21 NOTE — CONSULT NOTE ADULT - ASSESSMENT
Mr. Kan a 62 year old male with stable angina, positive stress test and 3-vessel CAD  presents for surgical intervention. S/p OPCAB (SVG and left Radial grafts)       A1C: ***  Weight: 44.50kg  BMI: 16.8  Creatinine: 1.45  GFR: 54  Ejection Fraction: 45% 10/19      # Type 2 diabetes mellitus  - Please continue lantus *** units at bedtime.   - Continue lispro *** units before each meal.  - Continue lispro moderate / low dose sliding scale four times daily with meals and at bedtime.  - Patient's fingerstick glucose goal is 100-180 mg/dL.    - For discharge, patient can ***.    - Patient can follow up at discharge with [facility] by calling [phone] to make an appointment.        Will continue to monitor.     Case discussed with Dr. Ohara. Primary team updated.     Service Pager: [phone]  Mr. Kan a 62 year old male with stable angina, positive stress test and 3-vessel CAD  presents for surgical intervention. S/p OPCAB (SVG and left Radial grafts). Pt has history of non-compliance for the last 15 years, but since last 3 months has been following strict diet control and has been taking diabetes medications.       A1C: 8.9  Weight: 44.50kg  BMI: 16.8  Creatinine: 1.45  GFR: 54  Ejection Fraction: 45% 10/19      # Type 2 diabetes mellitus  - Please continue lantus 12 units at bedtime.   - Continue lispro 4 units before each meal.  - Continue lispro moderate dose sliding scale four times daily with meals and at bedtime.  - Patient's fingerstick glucose goal is 100-180 mg/dL.    -Educated pt on the importance of consistent carb diet while on insulin. Advise pt of possible meal items to order to incorporate healthy carbs in meals.       - Patient can follow up at discharge with [facility] by calling [phone] to make an appointment.        Will continue to monitor.     Case discussed with Dr. Ohara. Primary team updated.     Service Pager: [phone]  Mr. Kan a 62 year old male with stable angina, positive stress test and 3-vessel CAD  presents for surgical intervention. S/p OPCAB (SVG and left Radial grafts). Pt has history of non-compliance for the last 15 years, but since last 3 months has been following strict diet control and has been taking diabetes medications.       A1C: 8.9  Weight: 44.50kg  BMI: 16.8  Creatinine: 1.45  GFR: 54  Ejection Fraction: 45% 10/19      # Type 2 diabetes mellitus  - Please start lantus 12 units at bedtime.   - Start lispro 4 units before each meal.  - Continue lispro moderate dose sliding scale four times daily with meals and at bedtime.  - Patient's fingerstick glucose goal is 100-180 mg/dL.    -Educated pt on the importance of consistent carb diet while on insulin. Advise pt of possible meal items to order to incorporate healthy carbs in meals.       - Patient can follow up at discharge with [facility] by calling [phone] to make an appointment.        Will continue to monitor.     Case discussed with Dr. Ohara. Primary team updated.     Service Pager: [phone]

## 2022-10-21 NOTE — PROGRESS NOTE ADULT - SUBJECTIVE AND OBJECTIVE BOX
Patient discussed on morning rounds with Dr. Graves    Operation / Date: 10/19 Opcab x 3 LIMA-LAD, SVG-PDA, Radial- OM EF 40-45%    SUBJECTIVE ASSESSMENT:  62y Male seen at bedside today. Patient endorsing mild-moderate pain around incision sites, and constipation. Denies any chest pain, palpitations, shortness of breath, wheezing, abd pain, nausea, vomiting, lightheadedness, headaches, fevers, or chills.         Vital Signs Last 24 Hrs  T(C): 37.6 (21 Oct 2022 09:18), Max: 37.6 (20 Oct 2022 17:27)  T(F): 99.6 (21 Oct 2022 09:18), Max: 99.6 (20 Oct 2022 17:27)  HR: 89 (21 Oct 2022 09:00) (89 - 105)  BP: 104/62 (21 Oct 2022 08:00) (104/62 - 104/62)  BP(mean): 79 (21 Oct 2022 08:00) (79 - 79)  RR: 18 (21 Oct 2022 09:00) (17 - 20)  SpO2: 93% (21 Oct 2022 09:00) (88% - 97%)    Parameters below as of 21 Oct 2022 09:00  Patient On (Oxygen Delivery Method): nasal cannula w/ humidification  O2 Flow (L/min): 4    I&O's Detail    20 Oct 2022 07:01  -  21 Oct 2022 07:00  --------------------------------------------------------  IN:    IV PiggyBack: 100 mL    Oral Fluid: 120 mL    sodium chloride 0.9%: 95 mL  Total IN: 315 mL    OUT:    Bulb (mL): 200 mL    Chest Tube (mL): 30 mL    Indwelling Catheter - Urethral (mL): 1155 mL  Total OUT: 1385 mL    Total NET: -1070 mL      21 Oct 2022 07:01  -  21 Oct 2022 10:23  --------------------------------------------------------  IN:  Total IN: 0 mL    OUT:    Bulb (mL): 0 mL    Indwelling Catheter - Urethral (mL): 75 mL  Total OUT: 75 mL    Total NET: -75 mL      CHEST TUBE:  No.   SHERI DRAIN:  Yes.  EPICARDIAL WIRES: Yes.  TIE DOWNS: Yes.  GREER: No.    PHYSICAL EXAM:  General: Well appearing, laying in bed on an incline, NAD  Neurological: AOx3, no focal neurological deficits  Cardiovascular: Regular Rate/Rhythm, S1/2 auscultated, No extra heart sounds  Respiratory: CTA b/l over all lung fields, No adventitious breath sounds  Gastrointestinal: Bowel sounds present in all 4 quadrants, Abdomen is soft, non-tender, non-distended  Extremities: No peripheral edema noted, 5/5 strength and full range of motion in all 4 extremities b/l  Vascular: 2+ peripheral pulses b/l in upper and lower extremities, Radial, DP  Incision Sites: MSI, EVH, Radial, CT sites all c/d/i, no erythema, no purulence. No sternal click.     LABS:                        7.1    12.21 )-----------( 174      ( 21 Oct 2022 06:20 )             20.6       COUMADIN:  Yes/No. REASON: .    PT/INR - ( 21 Oct 2022 06:20 )   PT: 12.4 sec;   INR: 1.04          PTT - ( 21 Oct 2022 06:20 )  PTT:35.8 sec    10-21    137  |  105  |  40<H>  ----------------------------<  218<H>  4.1   |  18<L>  |  1.45<H>    Ca    9.0      21 Oct 2022 06:20  Phos  2.7     10-21  Mg     2.0     10-21    TPro  5.7<L>  /  Alb  3.6  /  TBili  0.7  /  DBili  x   /  AST  26  /  ALT  17  /  AlkPhos  50  10-21          MEDICATIONS  (STANDING):  aspirin enteric coated 81 milliGRAM(s) Oral daily  atorvastatin 40 milliGRAM(s) Oral at bedtime  chlorhexidine 2% Cloths 1 Application(s) Topical daily  clopidogrel Tablet 75 milliGRAM(s) Oral daily  dextrose 5%. 1000 milliLiter(s) (50 mL/Hr) IV Continuous <Continuous>  dextrose 5%. 1000 milliLiter(s) (100 mL/Hr) IV Continuous <Continuous>  dextrose 5%. 1000 milliLiter(s) (50 mL/Hr) IV Continuous <Continuous>  dextrose 5%. 1000 milliLiter(s) (100 mL/Hr) IV Continuous <Continuous>  dextrose 50% Injectable 25 Gram(s) IV Push once  dextrose 50% Injectable 12.5 Gram(s) IV Push once  dextrose 50% Injectable 25 Gram(s) IV Push once  dextrose 50% Injectable 25 Gram(s) IV Push once  dextrose 50% Injectable 12.5 Gram(s) IV Push once  dextrose 50% Injectable 25 Gram(s) IV Push once  glucagon  Injectable 1 milliGRAM(s) IntraMuscular once  glucagon  Injectable 1 milliGRAM(s) IntraMuscular once  heparin   Injectable 5000 Unit(s) SubCutaneous every 12 hours  influenza   Vaccine 0.5 milliLiter(s) IntraMuscular once  insulin glargine Injectable (LANTUS) 9 Unit(s) SubCutaneous at bedtime  insulin lispro (ADMELOG) corrective regimen sliding scale   SubCutaneous Before meals and at bedtime  insulin lispro Injectable (ADMELOG) 3 Unit(s) SubCutaneous three times a day before meals  metoprolol tartrate 12.5 milliGRAM(s) Oral every 12 hours  pantoprazole    Tablet 40 milliGRAM(s) Oral before breakfast  polyethylene glycol 3350 17 Gram(s) Oral daily  sodium chloride 0.9% lock flush 3 milliLiter(s) IV Push every 8 hours  sodium chloride 0.9%. 1000 milliLiter(s) (10 mL/Hr) IV Continuous <Continuous>    MEDICATIONS  (PRN):  acetaminophen     Tablet .. 650 milliGRAM(s) Oral every 6 hours PRN Mild Pain (1 - 3)  dextrose Oral Gel 15 Gram(s) Oral once PRN Blood Glucose LESS THAN 70 milliGRAM(s)/deciliter  dextrose Oral Gel 15 Gram(s) Oral once PRN Blood Glucose LESS THAN 70 milliGRAM(s)/deciliter  oxyCODONE    IR 5 milliGRAM(s) Oral every 4 hours PRN Moderate Pain (4 - 6)  oxyCODONE    IR 10 milliGRAM(s) Oral every 6 hours PRN Severe Pain (7 - 10)        RADIOLOGY & ADDITIONAL TESTS:     Patient discussed on morning rounds with Dr. Graves    Operation / Date: 10/19 Opcab x 3 LIMA-LAD, SVG-PDA, Radial- OM EF 40-45%    SUBJECTIVE ASSESSMENT:  62y Male seen at bedside today. Patient endorsing mild-moderate pain around incision sites, and constipation. Denies any chest pain, palpitations, shortness of breath, wheezing, abd pain, nausea, vomiting, lightheadedness, headaches, fevers, or chills.         Vital Signs Last 24 Hrs  T(C): 37.6 (21 Oct 2022 09:18), Max: 37.6 (20 Oct 2022 17:27)  T(F): 99.6 (21 Oct 2022 09:18), Max: 99.6 (20 Oct 2022 17:27)  HR: 89 (21 Oct 2022 09:00) (89 - 105)  BP: 104/62 (21 Oct 2022 08:00) (104/62 - 104/62)  BP(mean): 79 (21 Oct 2022 08:00) (79 - 79)  RR: 18 (21 Oct 2022 09:00) (17 - 20)  SpO2: 93% (21 Oct 2022 09:00) (88% - 97%)    Parameters below as of 21 Oct 2022 09:00  Patient On (Oxygen Delivery Method): nasal cannula w/ humidification  O2 Flow (L/min): 4    I&O's Detail    20 Oct 2022 07:01  -  21 Oct 2022 07:00  --------------------------------------------------------  IN:    IV PiggyBack: 100 mL    Oral Fluid: 120 mL    sodium chloride 0.9%: 95 mL  Total IN: 315 mL    OUT:    Bulb (mL): 200 mL    Chest Tube (mL): 30 mL    Indwelling Catheter - Urethral (mL): 1155 mL  Total OUT: 1385 mL    Total NET: -1070 mL      21 Oct 2022 07:01  -  21 Oct 2022 10:23  --------------------------------------------------------  IN:  Total IN: 0 mL    OUT:    Bulb (mL): 0 mL    Indwelling Catheter - Urethral (mL): 75 mL  Total OUT: 75 mL    Total NET: -75 mL      CHEST TUBE:  No.   SHERI DRAIN:  Yes.  EPICARDIAL WIRES: Yes.  TIE DOWNS: Yes.  GREER: No.    PHYSICAL EXAM:  General: Well appearing, laying in bed on an incline, NAD  Neurological: AOx3, no focal neurological deficits  Cardiovascular: Regular Rate/Rhythm, S1/2 auscultated, No extra heart sounds  Respiratory: CTA b/l over all lung fields, No adventitious breath sounds  Gastrointestinal: Bowel sounds present in all 4 quadrants, Abdomen is soft, non-tender, non-distended  Extremities: No peripheral edema noted, 5/5 strength and full range of motion in all 4 extremities b/l  Vascular: 2+ peripheral pulses b/l in upper and lower extremities, Radial, DP  Incision Sites: MSI, EVH, Radial, CT sites all c/d/i, no erythema, no purulence. No sternal click.     LABS:                        7.1    12.21 )-----------( 174      ( 21 Oct 2022 06:20 )             20.6       COUMADIN:  No    PT/INR - ( 21 Oct 2022 06:20 )   PT: 12.4 sec;   INR: 1.04          PTT - ( 21 Oct 2022 06:20 )  PTT:35.8 sec    10-21    137  |  105  |  40<H>  ----------------------------<  218<H>  4.1   |  18<L>  |  1.45<H>    Ca    9.0      21 Oct 2022 06:20  Phos  2.7     10-21  Mg     2.0     10-21    TPro  5.7<L>  /  Alb  3.6  /  TBili  0.7  /  DBili  x   /  AST  26  /  ALT  17  /  AlkPhos  50  10-21          MEDICATIONS  (STANDING):  aspirin enteric coated 81 milliGRAM(s) Oral daily  atorvastatin 40 milliGRAM(s) Oral at bedtime  chlorhexidine 2% Cloths 1 Application(s) Topical daily  clopidogrel Tablet 75 milliGRAM(s) Oral daily  dextrose 5%. 1000 milliLiter(s) (50 mL/Hr) IV Continuous <Continuous>  dextrose 5%. 1000 milliLiter(s) (100 mL/Hr) IV Continuous <Continuous>  dextrose 5%. 1000 milliLiter(s) (50 mL/Hr) IV Continuous <Continuous>  dextrose 5%. 1000 milliLiter(s) (100 mL/Hr) IV Continuous <Continuous>  dextrose 50% Injectable 25 Gram(s) IV Push once  dextrose 50% Injectable 12.5 Gram(s) IV Push once  dextrose 50% Injectable 25 Gram(s) IV Push once  dextrose 50% Injectable 25 Gram(s) IV Push once  dextrose 50% Injectable 12.5 Gram(s) IV Push once  dextrose 50% Injectable 25 Gram(s) IV Push once  glucagon  Injectable 1 milliGRAM(s) IntraMuscular once  glucagon  Injectable 1 milliGRAM(s) IntraMuscular once  heparin   Injectable 5000 Unit(s) SubCutaneous every 12 hours  influenza   Vaccine 0.5 milliLiter(s) IntraMuscular once  insulin glargine Injectable (LANTUS) 9 Unit(s) SubCutaneous at bedtime  insulin lispro (ADMELOG) corrective regimen sliding scale   SubCutaneous Before meals and at bedtime  insulin lispro Injectable (ADMELOG) 3 Unit(s) SubCutaneous three times a day before meals  metoprolol tartrate 12.5 milliGRAM(s) Oral every 12 hours  pantoprazole    Tablet 40 milliGRAM(s) Oral before breakfast  polyethylene glycol 3350 17 Gram(s) Oral daily  sodium chloride 0.9% lock flush 3 milliLiter(s) IV Push every 8 hours  sodium chloride 0.9%. 1000 milliLiter(s) (10 mL/Hr) IV Continuous <Continuous>    MEDICATIONS  (PRN):  acetaminophen     Tablet .. 650 milliGRAM(s) Oral every 6 hours PRN Mild Pain (1 - 3)  dextrose Oral Gel 15 Gram(s) Oral once PRN Blood Glucose LESS THAN 70 milliGRAM(s)/deciliter  dextrose Oral Gel 15 Gram(s) Oral once PRN Blood Glucose LESS THAN 70 milliGRAM(s)/deciliter  oxyCODONE    IR 5 milliGRAM(s) Oral every 4 hours PRN Moderate Pain (4 - 6)  oxyCODONE    IR 10 milliGRAM(s) Oral every 6 hours PRN Severe Pain (7 - 10)        RADIOLOGY & ADDITIONAL TESTS:

## 2022-10-21 NOTE — PHYSICAL THERAPY INITIAL EVALUATION ADULT - ADDITIONAL COMMENTS
Lives wit wife in a house with 6 [address] and 7 steps in the home. Patient was independent PTA. Denies use of AD

## 2022-10-21 NOTE — CONSULT NOTE ADULT - SUBJECTIVE AND OBJECTIVE BOX
HISTORY OF PRESENT ILLNESS  JAMSHID CR is a 62y Male with a past medical history of     DIABETES HISTORY  Age at diagnosis:   How was he diagnosed:   Current Therapy / History of other regimens:   History of hypoglycemia:   History of DKA/HHS:   Home FSG:  Diet:    Activity:    Complications:   Outpatient Endo:     PAST MEDICAL & SURGICAL HISTORY: as per HPI.    FAMILY HISTORY  DM:  Thyroid:  Autoimmune:  Other:    SOCIAL HISTORY  Work:  Smoking:  Etoh:  Recreational Drugs:    ALLERGIES  No Known Allergies    CURRENT MEDICATIONS  acetaminophen     Tablet .. 650 milliGRAM(s) Oral every 6 hours PRN  amLODIPine   Tablet 2.5 milliGRAM(s) Oral daily  aspirin enteric coated 81 milliGRAM(s) Oral daily  atorvastatin 40 milliGRAM(s) Oral at bedtime  benzocaine 15 mG/menthol 3.6 mG Lozenge 1 Lozenge Oral every 2 hours PRN  chlorhexidine 2% Cloths 1 Application(s) Topical daily  clopidogrel Tablet 75 milliGRAM(s) Oral daily  dextrose 5%. 1000 milliLiter(s) IV Continuous <Continuous>  dextrose 5%. 1000 milliLiter(s) IV Continuous <Continuous>  dextrose 5%. 1000 milliLiter(s) IV Continuous <Continuous>  dextrose 5%. 1000 milliLiter(s) IV Continuous <Continuous>  dextrose 50% Injectable 25 Gram(s) IV Push once  dextrose 50% Injectable 12.5 Gram(s) IV Push once  dextrose 50% Injectable 25 Gram(s) IV Push once  dextrose 50% Injectable 25 Gram(s) IV Push once  dextrose 50% Injectable 12.5 Gram(s) IV Push once  dextrose 50% Injectable 25 Gram(s) IV Push once  dextrose Oral Gel 15 Gram(s) Oral once PRN  dextrose Oral Gel 15 Gram(s) Oral once PRN  glucagon  Injectable 1 milliGRAM(s) IntraMuscular once  glucagon  Injectable 1 milliGRAM(s) IntraMuscular once  heparin   Injectable 5000 Unit(s) SubCutaneous every 12 hours  influenza   Vaccine 0.5 milliLiter(s) IntraMuscular once  insulin glargine Injectable (LANTUS) 9 Unit(s) SubCutaneous at bedtime  insulin lispro (ADMELOG) corrective regimen sliding scale   SubCutaneous Before meals and at bedtime  insulin lispro Injectable (ADMELOG) 3 Unit(s) SubCutaneous three times a day before meals  metoprolol tartrate 12.5 milliGRAM(s) Oral every 12 hours  oxyCODONE    IR 5 milliGRAM(s) Oral every 4 hours PRN  oxyCODONE    IR 10 milliGRAM(s) Oral every 6 hours PRN  pantoprazole    Tablet 40 milliGRAM(s) Oral before breakfast  polyethylene glycol 3350 17 Gram(s) Oral daily  senna 2 Tablet(s) Oral at bedtime  sodium chloride 0.9% lock flush 3 milliLiter(s) IV Push every 8 hours  sodium chloride 0.9%. 1000 milliLiter(s) IV Continuous <Continuous>    REVIEW OF SYSTEMS  Constitutional:  Negative fever, chills or loss of appetite.  Eyes:  Negative blurry vision or double vision.  Cardiovascular:  Negative for chest pain or palpitations.  Respiratory:  Negative for cough, wheezing, or SOB.   Gastrointestinal:  Negative for nausea, vomiting, diarrhea, constipation, or abdominal pain.  Genitourinary:  Negative frequency, urgency or dysuria.  Neurologic:  No headache, confusion, dizziness, lightheadedness.    PHYSICAL EXAM  Vital Signs Last 24 Hrs  T(C): 37.6 (21 Oct 2022 09:18), Max: 37.6 (20 Oct 2022 17:27)  T(F): 99.6 (21 Oct 2022 09:18), Max: 99.6 (20 Oct 2022 17:27)  HR: 89 (21 Oct 2022 09:00) (89 - 105)  BP: 104/62 (21 Oct 2022 08:00) (104/62 - 104/62)  BP(mean): 79 (21 Oct 2022 08:00) (79 - 79)  RR: 18 (21 Oct 2022 09:00) (17 - 20)  SpO2: 93% (21 Oct 2022 09:00) (88% - 97%)    Parameters below as of 21 Oct 2022 09:00  Patient On (Oxygen Delivery Method): nasal cannula w/ humidification  O2 Flow (L/min): 4    Height (cm): 162.6 (10-19 @ 11:44)  Weight (kg): 44.5 (10-19 @ 11:44)  BMI (kg/m2): 16.8 (10-19 @ 11:44)    Constitutional: Awake, alert, in no acute distress.   HEENT: Normocephalic, atraumatic, RAH, no proptosis or lid retraction.   Neck: supple, no acanthosis, no thyromegaly or palpable thyroid nodules.  Respiratory: Lungs clear to ausculation bilaterally.   Cardiovascular: regular rhythm, normal S1 and S2, no audible murmurs.   GI: soft, non-tender, non-distended, bowel sounds present, no masses appreciated.  Extremities: No lower extremity edema, peripheral pulses present.   Skin: no rashes.   Psychiatric: AAO x 3. Normal affect/mood.     LABS                        7.4    11.98 )-----------( 165      ( 21 Oct 2022 10:54 )             22.1     10-21    137  |  105  |  40<H>  ----------------------------<  218<H>  4.1   |  18<L>  |  1.45<H>    Ca    9.0      21 Oct 2022 06:20  Phos  2.7     10  Mg     2.0     10-21    TPro  5.7<L>  /  Alb  3.6  /  TBili  0.7  /  DBili  x   /  AST  26  /  ALT  17  /  AlkPhos  50  10    LIVER FUNCTIONS - ( 21 Oct 2022 06:20 )  Alb: 3.6 g/dL / Pro: 5.7 g/dL / ALK PHOS: 50 U/L / ALT: 17 U/L / AST: 26 U/L / GGT: x         PT/INR - ( 21 Oct 2022 06:20 )   PT: 12.4 sec;   INR: 1.04     PTT - ( 21 Oct 2022 06:20 )  PTT:35.8 sec  Thyroid Stimulating Hormone, Serum: 3.930 (10-03-22)      CAPILLARY BLOOD GLUCOSE & INSULIN RECEIVED  Yesterday  - Dinner FS mg/dL = 0 units of premeal Lispro + 2 units of Lispro sliding scale.   - Bedtime FS mg/dL = 0 units of Lantus + 4 units Lispro sliding scale.     Today  - Breakfast FS mg/dL = 0 units of premeal Lispro + 4 units of Lispro sliding scale.   - Lunch FSG: *** mg/dL = *** units of premeal Lispro + *** units of Lispro sliding scale.     CAPILLARY BLOOD GLUCOSE  POCT Blood Glucose.: 202 mg/dL (21 Oct 2022 11:09)  POCT Blood Glucose.: 228 mg/dL (21 Oct 2022 07:26)  POCT Blood Glucose.: 212 mg/dL (20 Oct 2022 21:45)  POCT Blood Glucose.: 178 mg/dL (20 Oct 2022 17:06)  POCT Blood Glucose.: 163 mg/dL (20 Oct 2022 11:53)         HISTORY OF PRESENT ILLNESS  JAMSHID CR is a 62y Male with a past medical history of     DIABETES HISTORY  Age at diagnosis: about 40 years ago, 22  How was he diagnosed: Clinic [address]. Unable to recall further information.   Current Therapy / History of other regimens: Actos 30mg qd, metformin 1g bid, glipizide 10mg qd.  History of hypoglycemia: states symptoms of sweating when BG is low, usually around 50s.   History of DKA/HHS: denies  Home FSG: Consistently in the 130s.   Diet:  Normal diet, no restrictions.   Activity:  Able to walk at home, restricted due to SOB.   Complications: paraesthesias of the hands and feet, denies taking any mediations. Complains of blurry vision, but unsure if its of old age or DM.    Outpatient Endo: Has not gone to a endo provider last 15 years, but recently has gone to Dr. Roman. Wife states that sugar levels have gone down once pt started taking medications.     PAST MEDICAL & SURGICAL HISTORY: as per HPI.    FAMILY HISTORY  DM: Mother   Thyroid: negative  Autoimmune: negative  Other:    SOCIAL HISTORY  Work: currently not working   Smoking: current smoker, 1/2 pack for 47 years  Etoh: denies  Recreational Drugs: denies    ALLERGIES  No Known Allergies    CURRENT MEDICATIONS  acetaminophen     Tablet .. 650 milliGRAM(s) Oral every 6 hours PRN  amLODIPine   Tablet 2.5 milliGRAM(s) Oral daily  aspirin enteric coated 81 milliGRAM(s) Oral daily  atorvastatin 40 milliGRAM(s) Oral at bedtime  benzocaine 15 mG/menthol 3.6 mG Lozenge 1 Lozenge Oral every 2 hours PRN  chlorhexidine 2% Cloths 1 Application(s) Topical daily  clopidogrel Tablet 75 milliGRAM(s) Oral daily  dextrose 5%. 1000 milliLiter(s) IV Continuous <Continuous>  dextrose 5%. 1000 milliLiter(s) IV Continuous <Continuous>  dextrose 5%. 1000 milliLiter(s) IV Continuous <Continuous>  dextrose 5%. 1000 milliLiter(s) IV Continuous <Continuous>  dextrose 50% Injectable 25 Gram(s) IV Push once  dextrose 50% Injectable 12.5 Gram(s) IV Push once  dextrose 50% Injectable 25 Gram(s) IV Push once  dextrose 50% Injectable 25 Gram(s) IV Push once  dextrose 50% Injectable 12.5 Gram(s) IV Push once  dextrose 50% Injectable 25 Gram(s) IV Push once  dextrose Oral Gel 15 Gram(s) Oral once PRN  dextrose Oral Gel 15 Gram(s) Oral once PRN  glucagon  Injectable 1 milliGRAM(s) IntraMuscular once  glucagon  Injectable 1 milliGRAM(s) IntraMuscular once  heparin   Injectable 5000 Unit(s) SubCutaneous every 12 hours  influenza   Vaccine 0.5 milliLiter(s) IntraMuscular once  insulin glargine Injectable (LANTUS) 9 Unit(s) SubCutaneous at bedtime  insulin lispro (ADMELOG) corrective regimen sliding scale   SubCutaneous Before meals and at bedtime  insulin lispro Injectable (ADMELOG) 3 Unit(s) SubCutaneous three times a day before meals  metoprolol tartrate 12.5 milliGRAM(s) Oral every 12 hours  oxyCODONE    IR 5 milliGRAM(s) Oral every 4 hours PRN  oxyCODONE    IR 10 milliGRAM(s) Oral every 6 hours PRN  pantoprazole    Tablet 40 milliGRAM(s) Oral before breakfast  polyethylene glycol 3350 17 Gram(s) Oral daily  senna 2 Tablet(s) Oral at bedtime  sodium chloride 0.9% lock flush 3 milliLiter(s) IV Push every 8 hours  sodium chloride 0.9%. 1000 milliLiter(s) IV Continuous <Continuous>    REVIEW OF SYSTEMS  Constitutional:  Negative fever, chills or loss of appetite. Positive for fatigue.   Eyes:  Negative blurry vision or double vision.  Cardiovascular:  Negative for chest pain, but states of history of chest pain. negative for palpations.   Respiratory:  Negative for cough, wheezing. Positive for dyspnea on exertaion.   Gastrointestinal:  Negative for nausea, vomiting, diarrhea, constipation, or abdominal pain.  Genitourinary:  Negative frequency, urgency or dysuria.  Neurologic:  No headache, confusion, dizziness, lightheadedness.      PHYSICAL EXAM  Vital Signs Last 24 Hrs  T(C): 37.6 (21 Oct 2022 09:18), Max: 37.6 (20 Oct 2022 17:27)  T(F): 99.6 (21 Oct 2022 09:18), Max: 99.6 (20 Oct 2022 17:27)  HR: 89 (21 Oct 2022 09:00) (89 - 105)  BP: 104/62 (21 Oct 2022 08:00) (104/62 - 104/62)  BP(mean): 79 (21 Oct 2022 08:00) (79 - 79)  RR: 18 (21 Oct 2022 09:00) (17 - 20)  SpO2: 93% (21 Oct 2022 09:00) (88% - 97%)    Parameters below as of 21 Oct 2022 09:00  Patient On (Oxygen Delivery Method): nasal cannula w/ humidification  O2 Flow (L/min): 4    Height (cm): 162.6 (10-19 @ 11:44)  Weight (kg): 44.5 (10-19 @ 11:44)  BMI (kg/m2): 16.8 (10-19 @ 11:44)    Constitutional	well-groomed; no distress  Eyes	PERRL; EOMI; conjunctiva clear  ENMT	no gross abnormalities  Respiratory	clear to auscultation bilaterally; no wheezes; no rales; no rhonchi; no respiratory distress; no use of accessory muscles; respirations non-labored  Cardiovascular	regular rate and rhythm; S1 S2 present; no murmur; no JVD; no pedal edema; vascular  Radial Pulse	right normal; left normal; 2+ Bilaterally  Femoral Pulse	right normal; left normal; 2+ Bilaterally-No bruits  DP Pulse	right normal; left normal; 2+ Bilaterally  PT Pulse	Faint to 1+ Bilaterally  Gastrointestinal	soft; nontender; nondistended; normal active bowel sounds  Neurological	cranial nerves II-XII intact; responds to pain; responds to verbal commands  Musculoskeletal	strength 5/5 bilateral upper extremities; strength 5/5 bilateral lower extremities  Psychiatric	normal affect; alert and oriented x3; normal behavior    LABS                        7.4    11.98 )-----------( 165      ( 21 Oct 2022 10:54 )             22.1     10-    137  |  105  |  40<H>  ----------------------------<  218<H>  4.1   |  18<L>  |  1.45<H>    Ca    9.0      21 Oct 2022 06:20  Phos  2.7     10-21  Mg     2.0     10-    TPro  5.7<L>  /  Alb  3.6  /  TBili  0.7  /  DBili  x   /  AST  26  /  ALT  17  /  AlkPhos  50  10-21    LIVER FUNCTIONS - ( 21 Oct 2022 06:20 )  Alb: 3.6 g/dL / Pro: 5.7 g/dL / ALK PHOS: 50 U/L / ALT: 17 U/L / AST: 26 U/L / GGT: x         PT/INR - ( 21 Oct 2022 06:20 )   PT: 12.4 sec;   INR: 1.04     PTT - ( 21 Oct 2022 06:20 )  PTT:35.8 sec  Thyroid Stimulating Hormone, Serum: 3.930 (10-03-22)      CAPILLARY BLOOD GLUCOSE & INSULIN RECEIVED  Yesterday  - Dinner FS mg/dL = 0 units of premeal Lispro + 2 units of Lispro sliding scale.   - Bedtime FS mg/dL = 0 units of Lantus + 4 units Lispro sliding scale.     Today  - Breakfast FS mg/dL = 0 units of premeal Lispro + 4 units of Lispro sliding scale.   - Lunch FS mg/dL = *** units of premeal Lispro + *** units of Lispro sliding scale.     CAPILLARY BLOOD GLUCOSE  POCT Blood Glucose.: 202 mg/dL (21 Oct 2022 11:09)  POCT Blood Glucose.: 228 mg/dL (21 Oct 2022 07:26)  POCT Blood Glucose.: 212 mg/dL (20 Oct 2022 21:45)  POCT Blood Glucose.: 178 mg/dL (20 Oct 2022 17:06)  POCT Blood Glucose.: 163 mg/dL (20 Oct 2022 11:53)         HISTORY OF PRESENT ILLNESS  JAMSHID CR is a 62y Male with a past medical history of HTN, uncontrolled DM-2 (A1c 14.0) and HLD (, recently started on statin), moderate b/l carotid stenosis, presented to Dr. Jerez with c/o leg pain with ambulation and DAMON. He reports occasional chest pain but does not walk much due to DAMON and leg fatigue. Denies dizziness, palpitations, orthopnea/PND, leg swelling, LOC, bleeding, melena/hematochezia, fever, chills, URI symptoms, or recent illness. Exercise NST 22: hypotensive response to exercise with marked ST depression on EKG; inferior and inferolateral infarct with moderate edgardo-infarct ischemia; large anterior wall ischemia., TID 1.19, EF 38% TTE 22: EF 40-45%, grade 1 diastolic dysfunction, akinetic basal inferolateral, basal inferior, mid inferolateral, mid inferior, and mid inferoseptal. Dyskinetic mid anterolateral. No significant valvular disease. LE arterial duplex- no hemodynamically significant stenosis. In light of risk factors, CCS class III anginal equivalent symptoms, and high risk NST suggestive of multivessel CAD, patient is referred for cardiac catheterization. Patient is now s/p cardiac catheterization revealing 95% mLAD, Diag1 50%, mLCx 100%  (with L-L collaterals), RCA subtotal 90% stenosis, dRCA 80% diffuse disease, EF 35-40% with mid/basal akinesis. EDP 9 mmHG.   Admit under Dr. Graves  via same day surgery. Pt is now S/p OPCAB (SVG and left Radial grafts;10/19).       DIABETES HISTORY  Age at diagnosis: about 40 years ago, 22  How was he diagnosed: [address] Unable to recall further information.   Current Therapy / History of other regimens: Actos 30mg qd, metformin 1g bid, glipizide 10mg qd.  History of hypoglycemia: states symptoms of sweating when BG is low, usually around 50s.   History of DKA/HHS: denies  Home FS-80s range.   Diet:  Per wife, pt following strict diet at home consisting of vegetables, chicken, fish. Minimal rice or bread.   Activity:  Able to walk at home, restricted due to SOB.   Complications: paraesthesias of the hands and feet, denies taking any mediations. Complains of blurry vision, but unsure if its of old age or DM.    Outpatient Endo: Has not gone to a endo provider last 15 years, but recently has gone to Dr. Roman. Wife states that sugar levels have gone down once pt started taking medications.     PAST MEDICAL & SURGICAL HISTORY: as per HPI.    FAMILY HISTORY  DM: Mother   Thyroid: negative  Autoimmune: negative  Other:    SOCIAL HISTORY  Work: currently not working   Smoking: current smoker, 1/2 pack for 47 years  Etoh: denies  Recreational Drugs: denies    ALLERGIES  No Known Allergies    CURRENT MEDICATIONS  acetaminophen     Tablet .. 650 milliGRAM(s) Oral every 6 hours PRN  amLODIPine   Tablet 2.5 milliGRAM(s) Oral daily  aspirin enteric coated 81 milliGRAM(s) Oral daily  atorvastatin 40 milliGRAM(s) Oral at bedtime  benzocaine 15 mG/menthol 3.6 mG Lozenge 1 Lozenge Oral every 2 hours PRN  chlorhexidine 2% Cloths 1 Application(s) Topical daily  clopidogrel Tablet 75 milliGRAM(s) Oral daily  dextrose 5%. 1000 milliLiter(s) IV Continuous <Continuous>  dextrose 5%. 1000 milliLiter(s) IV Continuous <Continuous>  dextrose 5%. 1000 milliLiter(s) IV Continuous <Continuous>  dextrose 5%. 1000 milliLiter(s) IV Continuous <Continuous>  dextrose 50% Injectable 25 Gram(s) IV Push once  dextrose 50% Injectable 12.5 Gram(s) IV Push once  dextrose 50% Injectable 25 Gram(s) IV Push once  dextrose 50% Injectable 25 Gram(s) IV Push once  dextrose 50% Injectable 12.5 Gram(s) IV Push once  dextrose 50% Injectable 25 Gram(s) IV Push once  dextrose Oral Gel 15 Gram(s) Oral once PRN  dextrose Oral Gel 15 Gram(s) Oral once PRN  glucagon  Injectable 1 milliGRAM(s) IntraMuscular once  glucagon  Injectable 1 milliGRAM(s) IntraMuscular once  heparin   Injectable 5000 Unit(s) SubCutaneous every 12 hours  influenza   Vaccine 0.5 milliLiter(s) IntraMuscular once  insulin glargine Injectable (LANTUS) 9 Unit(s) SubCutaneous at bedtime  insulin lispro (ADMELOG) corrective regimen sliding scale   SubCutaneous Before meals and at bedtime  insulin lispro Injectable (ADMELOG) 3 Unit(s) SubCutaneous three times a day before meals  metoprolol tartrate 12.5 milliGRAM(s) Oral every 12 hours  oxyCODONE    IR 5 milliGRAM(s) Oral every 4 hours PRN  oxyCODONE    IR 10 milliGRAM(s) Oral every 6 hours PRN  pantoprazole    Tablet 40 milliGRAM(s) Oral before breakfast  polyethylene glycol 3350 17 Gram(s) Oral daily  senna 2 Tablet(s) Oral at bedtime  sodium chloride 0.9% lock flush 3 milliLiter(s) IV Push every 8 hours  sodium chloride 0.9%. 1000 milliLiter(s) IV Continuous <Continuous>    REVIEW OF SYSTEMS  Constitutional:  Negative fever, chills or loss of appetite. Positive for fatigue.   Eyes:  Negative blurry vision or double vision.  Cardiovascular:  Negative for chest pain, but states of history of chest pain. negative for palpations.   Respiratory:  Negative for cough, wheezing. Positive for dyspnea on exertaion.   Gastrointestinal:  Negative for nausea, vomiting, diarrhea, constipation, or abdominal pain.  Genitourinary:  Negative frequency, urgency or dysuria.  Neurologic:  No headache, confusion, dizziness, lightheadedness.      PHYSICAL EXAM  Vital Signs Last 24 Hrs  T(C): 37.6 (21 Oct 2022 09:18), Max: 37.6 (20 Oct 2022 17:27)  T(F): 99.6 (21 Oct 2022 09:18), Max: 99.6 (20 Oct 2022 17:27)  HR: 89 (21 Oct 2022 09:00) (89 - 105)  BP: 104/62 (21 Oct 2022 08:00) (104/62 - 104/62)  BP(mean): 79 (21 Oct 2022 08:00) (79 - 79)  RR: 18 (21 Oct 2022 09:00) (17 - 20)  SpO2: 93% (21 Oct 2022 09:00) (88% - 97%)    Parameters below as of 21 Oct 2022 09:00  Patient On (Oxygen Delivery Method): nasal cannula w/ humidification  O2 Flow (L/min): 4    Height (cm): 162.6 (10-19 @ 11:44)  Weight (kg): 44.5 (10-19 @ 11:44)  BMI (kg/m2): 16.8 (10-19 @ 11:44)    Constitutional	well-groomed; no distress  Eyes	PERRL; EOMI; conjunctiva clear  ENMT	no gross abnormalities  Respiratory	clear to auscultation bilaterally; no wheezes; no rales; no rhonchi; no respiratory distress; no use of accessory muscles; respirations non-labored  Cardiovascular	regular rate and rhythm; S1 S2 present; no murmur; no JVD; no pedal edema; vascular  Radial Pulse	right normal; left normal; 2+ Bilaterally  Femoral Pulse	right normal; left normal; 2+ Bilaterally-No bruits  DP Pulse	right normal; left normal; 2+ Bilaterally  PT Pulse	Faint to 1+ Bilaterally  Gastrointestinal	soft; nontender; nondistended; normal active bowel sounds  Neurological	cranial nerves II-XII intact; responds to pain; responds to verbal commands  Musculoskeletal	strength 5/5 bilateral upper extremities; strength 5/5 bilateral lower extremities  Psychiatric	normal affect; alert and oriented x3; normal behavior    LABS                        7.4    11.98 )-----------( 165      ( 21 Oct 2022 10:54 )             22.1     10    137  |  105  |  40<H>  ----------------------------<  218<H>  4.1   |  18<L>  |  1.45<H>    Ca    9.0      21 Oct 2022 06:20  Phos  2.7     10-  Mg     2.0     10-21    TPro  5.7<L>  /  Alb  3.6  /  TBili  0.7  /  DBili  x   /  AST  26  /  ALT  17  /  AlkPhos  50  10-    LIVER FUNCTIONS - ( 21 Oct 2022 06:20 )  Alb: 3.6 g/dL / Pro: 5.7 g/dL / ALK PHOS: 50 U/L / ALT: 17 U/L / AST: 26 U/L / GGT: x         PT/INR - ( 21 Oct 2022 06:20 )   PT: 12.4 sec;   INR: 1.04     PTT - ( 21 Oct 2022 06:20 )  PTT:35.8 sec  Thyroid Stimulating Hormone, Serum: 3.930 (10-03-22)      CAPILLARY BLOOD GLUCOSE & INSULIN RECEIVED  Yesterday  - Dinner FS mg/dL = 0 units of premeal Lispro + 2 units of Lispro sliding scale.   - Bedtime FS mg/dL = 0 units of Lantus + 4 units Lispro sliding scale.     Today  - Breakfast FS mg/dL = 0 units of premeal Lispro + 4 units of Lispro sliding scale.   - Lunch FS mg/dL = 3 units of premeal Lispro + 4 units of Lispro sliding scale.     CAPILLARY BLOOD GLUCOSE  POCT Blood Glucose.: 202 mg/dL (21 Oct 2022 11:09)  POCT Blood Glucose.: 228 mg/dL (21 Oct 2022 07:26)  POCT Blood Glucose.: 212 mg/dL (20 Oct 2022 21:45)  POCT Blood Glucose.: 178 mg/dL (20 Oct 2022 17:06)  POCT Blood Glucose.: 163 mg/dL (20 Oct 2022 11:53)

## 2022-10-21 NOTE — PHYSICAL THERAPY INITIAL EVALUATION ADULT - PERTINENT HX OF CURRENT PROBLEM, REHAB EVAL
62 year old Cantonese-speaking male, current smoker, with PMHx HTN, uncontrolled DM-2 (A1c 14.0) and HLD (, recently started on statin), moderate b/l carotid stenosis, presented to Dr. Jerez with c/o leg pain with ambulation and DAMON. He reports occasional chest pain but does not walk much due to DAMON and leg fatigue

## 2022-10-21 NOTE — PROGRESS NOTE ADULT - ASSESSMENT
recent current smoker on Chantix preop, HTN, uncontrolled DM-2 (A1c 14.0) and HLD (, recently started on statin), moderate b/l carotid stenosis    62 year old Cantonese-speaking male, current smoker, with PMHx HTN, uncontrolled DM-2 (A1c 14.0) and HLD (, recently started on statin), moderate b/l carotid stenosis, presented to Dr. Jerez with c/o leg pain with ambulation and DAMON. He reports occasional chest pain but does not walk much due to DAMON and leg fatigue. Exercise NST 9/2/22: hypotensive response to exercise with marked ST depression on EKG; inferior and inferolateral infarct with moderate edgardo-infarct ischemia; large anterior wall ischemia., TID 1.19, EF 38%. TTE 8/9/22: EF 40-45%, grade 1 diastolic dysfunction, akinetic basal inferolateral, basal inferior, mid inferolateral, mid inferior, and mid inferoseptal. Dyskinetic mid anterolateral. No significant valvular disease. LE arterial duplex- no hemodynamically significant stenosis. Patient was referred for cardiac catheterization revealing 95% mLAD, Diag1 50%, mLCx 100%  (with L-L collaterals), RCA subtotal 90% stenosis, dRCA 80% diffuse disease, EF 35-40% with mid/basal akinesis. EDP 9 mmHG. Patient was admitted to [facility] under the care of Dr. Graves and on 10/19 underwent uncomplicated OPCAB x3 (LIMA-LAD, SVG-PDA, Radial-OM). Patient arrived to ICU on no gtts, but Levo was added in short postop course for low BP. He was volumized and Levo was weaned. POD1 patient received continued volume resuscitation, L pleural tube was removed, and was transferred to mini-ICU. POD2 patient H/H downtrending to 7/20, repeat showed __________          Plan:    Neurovascular:   - Pain well controlled with current regimen. PRN's: tylenol, oxy    Cardiovascular:   - CAD        - s/p OPCAB x3 10/19       - Cont ASA, Plavix, Metoprolol 12.5 q12, Lipitor 40       - Radial graft: started low dose Norvasc 2.5  - Hx HTN/HLD Cont. BB, CCB, Statin  - Hemodynamically stable.   - Monitor: BP, HR, tele    Respiratory:   - Oxygenating well on room air  - Encourage continued use of IS 10x/hr and frequent ambulation  - CXR: stable, no acute infiltrates, no PTX    GI:  - GI PPX: Protonix  - PO Diet  - Bowel Regimen: Miralax, started senna     Renal / :  - Continue to monitor renal function: BUN/Cr   - Monitor I/O's daily     Endocrine:    - No hx of DM or thyroid dx  - A1c:  - TSH:    Hematologic:  - CBC: H/H-  - Coagulation Panel.    ID:  - Temperature:   - CBC: WBC-  - Continue to observe for SIRS/Sepsis Syndrome.    Prophylaxis:  - DVT prophylaxis with 5000 SubQ Heparin q8h.  - Continue with SCD's b/l while patient is at rest     Disposition:  - Discharge home once patient is medically ready  recent current smoker on Chantix preop, HTN, uncontrolled DM-2 (A1c 14.0) and HLD (, recently started on statin), moderate b/l carotid stenosis    62 year old Cantonese-speaking male, current smoker, with PMHx HTN, uncontrolled DM-2 (A1c 14.0) and HLD (, recently started on statin), moderate b/l carotid stenosis, presented to Dr. Jerez with c/o leg pain with ambulation and DAMON. He reports occasional chest pain but does not walk much due to DAMON and leg fatigue. Exercise NST 9/2/22: hypotensive response to exercise with marked ST depression on EKG; inferior and inferolateral infarct with moderate edgardo-infarct ischemia; large anterior wall ischemia., TID 1.19, EF 38%. TTE 8/9/22: EF 40-45%, grade 1 diastolic dysfunction, akinetic basal inferolateral, basal inferior, mid inferolateral, mid inferior, and mid inferoseptal. Dyskinetic mid anterolateral. No significant valvular disease. LE arterial duplex- no hemodynamically significant stenosis. Patient was referred for cardiac catheterization revealing 95% mLAD, Diag1 50%, mLCx 100%  (with L-L collaterals), RCA subtotal 90% stenosis, dRCA 80% diffuse disease, EF 35-40% with mid/basal akinesis. EDP 9 mmHG. Patient was admitted to [facility] under the care of Dr. Graves and on 10/19 underwent uncomplicated OPCAB x3 (LIMA-LAD, SVG-PDA, Radial-OM). Patient arrived to ICU on no gtts, but Levo was added in short postop course for low BP. He was volumized and Levo was weaned. POD1 patient received continued volume resuscitation, L pleural tube was removed, and was transferred to mini-ICU. POD2 patient H/H downtrending to 7/20, repeat CBC was stable.       Plan:    Neurovascular:   - Pain well controlled with current regimen. PRN's: tylenol, oxy    Cardiovascular:   - CAD        - s/p OPCAB x3 10/19       - Cont ASA, Plavix, Metoprolol 12.5 q12, Lipitor 40       - Radial graft: started low dose Norvasc 2.5  - Hx HTN/HLD Cont. BB, CCB, Statin  - Hemodynamically stable.   - Monitor: BP, HR, tele    Respiratory:   - Oxygenating well on room air  - Encourage continued use of IS 10x/hr and frequent ambulation  - CXR: stable, no acute infiltrates, no PTX    GI:  - GI PPX: Protonix  - PO Diet  - Bowel Regimen: Miralax, started senna     Renal / :  - BUN/Cr 40/1.45 (35/1.31 yesterday)       - Continue to monitor renal function  - Monitor I/O's daily   - Replete Electrolytes PRN    Endocrine:    - Hx of DM        - A1c: 8.9 (down from 14 as outpatient)       - Started on Lantus/Lispro       - Cont. MISS       - Endocrine consulted, appreciate recs  - No Hx of thyroid dx       - TSH: 3.930    Hematologic:  - CBC: H/H- 7.4/22.1 (stable from this mornings 7.1/20.6)  - Cont. to trend CBC  - Coagulation Panel. PT/PTT/INR 12.4/35.8/1.04    ID:  - Temperature: afebrile  - CBC: WBC- 12.21  - Continue to observe for SIRS/Sepsis Syndrome.    Prophylaxis:  - DVT prophylaxis with 5000 SubQ Heparin q12h.  - Continue with SCD's b/l while patient is at rest     Disposition:  - Discharge home once patient is medically ready

## 2022-10-21 NOTE — CONSULT NOTE ADULT - ATTENDING COMMENTS
Pt seen on rounds this afternoon.  Wife and son were at the bedside.  62-yo man with a 20-year history of type 2 DM, but had not seen a physician for many years until 3 months ago.  A1c level apparently 14% at that time, was then started on a combination of metformin, Actos and glipizide.  Fingersticks at home (mostly in the AM) are often down to the  range.  Is actually fairly compliant with diet, and carb intake is quite modest  Has been an active cigarette smoker for most of his adult life, stopped only just before coming into the hospital  Stress test which was done because of worsening DAMON was markedly positive, including TID.  Subsequent cath showed 3VD, and he underwent CABG on 1/19  Glucoses were in the 160-180 range yesterday, but up to the low 200s today--likely because of increased PO Intake  He is on sliding scale coverage only.  Lungs with decreased breath sounds at both bases, faint crackles.  Feet are cool, cannot feel DP pulses (though Dopplers were apparently normal)  To start Lantus 12 units hs, premeal lispro 4 units

## 2022-10-22 LAB
ALBUMIN SERPL ELPH-MCNC: 3.1 G/DL — LOW (ref 3.3–5)
ALP SERPL-CCNC: 81 U/L — SIGNIFICANT CHANGE UP (ref 40–120)
ALT FLD-CCNC: 16 U/L — SIGNIFICANT CHANGE UP (ref 10–45)
ANION GAP SERPL CALC-SCNC: 13 MMOL/L — SIGNIFICANT CHANGE UP (ref 5–17)
ANION GAP SERPL CALC-SCNC: 9 MMOL/L — SIGNIFICANT CHANGE UP (ref 5–17)
APTT BLD: 35.1 SEC — SIGNIFICANT CHANGE UP (ref 27.5–35.5)
AST SERPL-CCNC: 30 U/L — SIGNIFICANT CHANGE UP (ref 10–40)
BILIRUB SERPL-MCNC: 1 MG/DL — SIGNIFICANT CHANGE UP (ref 0.2–1.2)
BUN SERPL-MCNC: 36 MG/DL — HIGH (ref 7–23)
BUN SERPL-MCNC: 39 MG/DL — HIGH (ref 7–23)
CALCIUM SERPL-MCNC: 8.6 MG/DL — SIGNIFICANT CHANGE UP (ref 8.4–10.5)
CALCIUM SERPL-MCNC: 9 MG/DL — SIGNIFICANT CHANGE UP (ref 8.4–10.5)
CHLORIDE SERPL-SCNC: 106 MMOL/L — SIGNIFICANT CHANGE UP (ref 96–108)
CHLORIDE SERPL-SCNC: 109 MMOL/L — HIGH (ref 96–108)
CO2 SERPL-SCNC: 17 MMOL/L — LOW (ref 22–31)
CO2 SERPL-SCNC: 20 MMOL/L — LOW (ref 22–31)
CREAT SERPL-MCNC: 1.43 MG/DL — HIGH (ref 0.5–1.3)
CREAT SERPL-MCNC: 1.43 MG/DL — HIGH (ref 0.5–1.3)
EGFR: 55 ML/MIN/1.73M2 — LOW
EGFR: 55 ML/MIN/1.73M2 — LOW
GLUCOSE BLDC GLUCOMTR-MCNC: 139 MG/DL — HIGH (ref 70–99)
GLUCOSE BLDC GLUCOMTR-MCNC: 147 MG/DL — HIGH (ref 70–99)
GLUCOSE BLDC GLUCOMTR-MCNC: 155 MG/DL — HIGH (ref 70–99)
GLUCOSE BLDC GLUCOMTR-MCNC: 159 MG/DL — HIGH (ref 70–99)
GLUCOSE BLDC GLUCOMTR-MCNC: 220 MG/DL — HIGH (ref 70–99)
GLUCOSE SERPL-MCNC: 139 MG/DL — HIGH (ref 70–99)
GLUCOSE SERPL-MCNC: 178 MG/DL — HIGH (ref 70–99)
HCT VFR BLD CALC: 22.9 % — LOW (ref 39–50)
HCT VFR BLD CALC: 30 % — LOW (ref 39–50)
HGB BLD-MCNC: 10.4 G/DL — LOW (ref 13–17)
HGB BLD-MCNC: 7.8 G/DL — LOW (ref 13–17)
INR BLD: 0.96 — SIGNIFICANT CHANGE UP (ref 0.88–1.16)
MAGNESIUM SERPL-MCNC: 2.1 MG/DL — SIGNIFICANT CHANGE UP (ref 1.6–2.6)
MCHC RBC-ENTMCNC: 29.7 PG — SIGNIFICANT CHANGE UP (ref 27–34)
MCHC RBC-ENTMCNC: 30 PG — SIGNIFICANT CHANGE UP (ref 27–34)
MCHC RBC-ENTMCNC: 34.1 GM/DL — SIGNIFICANT CHANGE UP (ref 32–36)
MCHC RBC-ENTMCNC: 34.7 GM/DL — SIGNIFICANT CHANGE UP (ref 32–36)
MCV RBC AUTO: 86.5 FL — SIGNIFICANT CHANGE UP (ref 80–100)
MCV RBC AUTO: 87.1 FL — SIGNIFICANT CHANGE UP (ref 80–100)
NRBC # BLD: 0 /100 WBCS — SIGNIFICANT CHANGE UP (ref 0–0)
NRBC # BLD: 0 /100 WBCS — SIGNIFICANT CHANGE UP (ref 0–0)
PHOSPHATE SERPL-MCNC: 2.6 MG/DL — SIGNIFICANT CHANGE UP (ref 2.5–4.5)
PLATELET # BLD AUTO: 138 K/UL — LOW (ref 150–400)
PLATELET # BLD AUTO: 154 K/UL — SIGNIFICANT CHANGE UP (ref 150–400)
POTASSIUM SERPL-MCNC: 3.9 MMOL/L — SIGNIFICANT CHANGE UP (ref 3.5–5.3)
POTASSIUM SERPL-MCNC: 4.7 MMOL/L — SIGNIFICANT CHANGE UP (ref 3.5–5.3)
POTASSIUM SERPL-SCNC: 3.9 MMOL/L — SIGNIFICANT CHANGE UP (ref 3.5–5.3)
POTASSIUM SERPL-SCNC: 4.7 MMOL/L — SIGNIFICANT CHANGE UP (ref 3.5–5.3)
PROT SERPL-MCNC: 5.9 G/DL — LOW (ref 6–8.3)
PROTHROM AB SERPL-ACNC: 11.4 SEC — SIGNIFICANT CHANGE UP (ref 10.5–13.4)
RBC # BLD: 2.63 M/UL — LOW (ref 4.2–5.8)
RBC # BLD: 3.47 M/UL — LOW (ref 4.2–5.8)
RBC # FLD: 14.4 % — SIGNIFICANT CHANGE UP (ref 10.3–14.5)
RBC # FLD: 14.6 % — HIGH (ref 10.3–14.5)
SODIUM SERPL-SCNC: 136 MMOL/L — SIGNIFICANT CHANGE UP (ref 135–145)
SODIUM SERPL-SCNC: 138 MMOL/L — SIGNIFICANT CHANGE UP (ref 135–145)
WBC # BLD: 8.06 K/UL — SIGNIFICANT CHANGE UP (ref 3.8–10.5)
WBC # BLD: 9.68 K/UL — SIGNIFICANT CHANGE UP (ref 3.8–10.5)
WBC # FLD AUTO: 8.06 K/UL — SIGNIFICANT CHANGE UP (ref 3.8–10.5)
WBC # FLD AUTO: 9.68 K/UL — SIGNIFICANT CHANGE UP (ref 3.8–10.5)

## 2022-10-22 PROCEDURE — 71045 X-RAY EXAM CHEST 1 VIEW: CPT | Mod: 26

## 2022-10-22 RX ORDER — SODIUM CHLORIDE 0.65 %
1 AEROSOL, SPRAY (ML) NASAL EVERY 8 HOURS
Refills: 0 | Status: DISCONTINUED | OUTPATIENT
Start: 2022-10-22 | End: 2022-10-24

## 2022-10-22 RX ORDER — INSULIN LISPRO 100/ML
4 VIAL (ML) SUBCUTANEOUS
Refills: 0 | Status: DISCONTINUED | OUTPATIENT
Start: 2022-10-22 | End: 2022-10-23

## 2022-10-22 RX ORDER — INSULIN GLARGINE 100 [IU]/ML
12 INJECTION, SOLUTION SUBCUTANEOUS AT BEDTIME
Refills: 0 | Status: DISCONTINUED | OUTPATIENT
Start: 2022-10-22 | End: 2022-10-23

## 2022-10-22 RX ORDER — POTASSIUM CHLORIDE 20 MEQ
20 PACKET (EA) ORAL ONCE
Refills: 0 | Status: COMPLETED | OUTPATIENT
Start: 2022-10-22 | End: 2022-10-22

## 2022-10-22 RX ADMIN — HEPARIN SODIUM 5000 UNIT(S): 5000 INJECTION INTRAVENOUS; SUBCUTANEOUS at 06:01

## 2022-10-22 RX ADMIN — POLYETHYLENE GLYCOL 3350 17 GRAM(S): 17 POWDER, FOR SOLUTION ORAL at 11:34

## 2022-10-22 RX ADMIN — INSULIN GLARGINE 12 UNIT(S): 100 INJECTION, SOLUTION SUBCUTANEOUS at 22:00

## 2022-10-22 RX ADMIN — SODIUM CHLORIDE 3 MILLILITER(S): 9 INJECTION INTRAMUSCULAR; INTRAVENOUS; SUBCUTANEOUS at 14:00

## 2022-10-22 RX ADMIN — Medication 4 UNIT(S): at 17:23

## 2022-10-22 RX ADMIN — CLOPIDOGREL BISULFATE 75 MILLIGRAM(S): 75 TABLET, FILM COATED ORAL at 11:35

## 2022-10-22 RX ADMIN — Medication 2: at 17:22

## 2022-10-22 RX ADMIN — SODIUM CHLORIDE 3 MILLILITER(S): 9 INJECTION INTRAMUSCULAR; INTRAVENOUS; SUBCUTANEOUS at 08:22

## 2022-10-22 RX ADMIN — Medication 3 UNIT(S): at 10:30

## 2022-10-22 RX ADMIN — Medication 650 MILLIGRAM(S): at 18:27

## 2022-10-22 RX ADMIN — AMLODIPINE BESYLATE 2.5 MILLIGRAM(S): 2.5 TABLET ORAL at 06:37

## 2022-10-22 RX ADMIN — Medication 12.5 MILLIGRAM(S): at 17:27

## 2022-10-22 RX ADMIN — HEPARIN SODIUM 5000 UNIT(S): 5000 INJECTION INTRAVENOUS; SUBCUTANEOUS at 17:26

## 2022-10-22 RX ADMIN — Medication 1 SPRAY(S): at 16:47

## 2022-10-22 RX ADMIN — CHLORHEXIDINE GLUCONATE 1 APPLICATION(S): 213 SOLUTION TOPICAL at 11:40

## 2022-10-22 RX ADMIN — Medication 81 MILLIGRAM(S): at 11:35

## 2022-10-22 RX ADMIN — PANTOPRAZOLE SODIUM 40 MILLIGRAM(S): 20 TABLET, DELAYED RELEASE ORAL at 06:00

## 2022-10-22 RX ADMIN — Medication 650 MILLIGRAM(S): at 17:27

## 2022-10-22 RX ADMIN — Medication 12.5 MILLIGRAM(S): at 06:00

## 2022-10-22 RX ADMIN — Medication 20 MILLIEQUIVALENT(S): at 09:43

## 2022-10-22 NOTE — PROGRESS NOTE ADULT - SUBJECTIVE AND OBJECTIVE BOX
Patient discussed on morning rounds with Dr. Sterling    Operation / Date: 10/19 Opcab x 3 LIMA-LAD, SVG-PDA, Radial- OM EF 40-45%    SUBJECTIVE ASSESSMENT:  62y Male seen at bedside today. Patient still endorsing mild-moderate pain around incision sites. Encouraged to walk as patient is unmotivated. Denies any chest pain, palpitations, shortness of breath, wheezing, abd pain, nausea, vomiting, lightheadedness, headaches, fevers, or chills.       Vital Signs Last 24 Hrs  T(C): 36.8 (22 Oct 2022 10:23), Max: 37.8 (21 Oct 2022 13:13)  T(F): 98.2 (22 Oct 2022 10:23), Max: 100.1 (21 Oct 2022 13:13)  HR: 86 (22 Oct 2022 08:30) (86 - 114)  BP: 118/71 (22 Oct 2022 08:30) (104/64 - 153/80)  BP(mean): 89 (22 Oct 2022 08:30) (79 - 107)  RR: 16 (22 Oct 2022 08:30) (16 - 20)  SpO2: 95% (22 Oct 2022 08:30) (91% - 97%)    Parameters below as of 22 Oct 2022 08:30  Patient On (Oxygen Delivery Method): nasal cannula w/ humidification  O2 Flow (L/min): 2    I&O's Detail    21 Oct 2022 07:01  -  22 Oct 2022 07:00  --------------------------------------------------------  IN:    IV PiggyBack: 67.5 mL    Oral Fluid: 560 mL    PRBCs (Packed Red Blood Cells): 300 mL    sodium chloride 0.9%: 20 mL  Total IN: 947.5 mL    OUT:    Bulb (mL): 25 mL    Indwelling Catheter - Urethral (mL): 260 mL    Voided (mL): 850 mL  Total OUT: 1135 mL    Total NET: -187.5 mL    CHEST TUBE:  No.   SHERI DRAIN:  No.  EPICARDIAL WIRES: Yes.  TIE DOWNS: Yes.  GREER: No.    PHYSICAL EXAM:  General: Well appearing, laying in bed on an incline, NAD  Neurological: AOx3, no focal neurological deficits  Cardiovascular: Regular Rate/Rhythm, S1/2 auscultated, No extra heart sounds  Respiratory: CTA b/l over all lung fields, No adventitious breath sounds  Gastrointestinal: Bowel sounds present in all 4 quadrants, Abdomen is soft, non-tender, non-distended  Extremities: No peripheral edema noted, 5/5 strength and full range of motion in all 4 extremities b/l  Vascular: 2+ peripheral pulses b/l in upper and lower extremities, Radial, DP  Incision Sites: MSI, EVH, Radial, CT sites all c/d/i, no erythema, no purulence. No sternal click.     LABS:                        7.8    8.06  )-----------( 138      ( 22 Oct 2022 08:00 )             22.9       COUMADIN:  No.     PT/INR - ( 22 Oct 2022 08:00 )   PT: 11.4 sec;   INR: 0.96          PTT - ( 22 Oct 2022 08:00 )  PTT:35.1 sec    10-22    138  |  109<H>  |  39<H>  ----------------------------<  139<H>  3.9   |  20<L>  |  1.43<H>    Ca    8.6      22 Oct 2022 08:00  Phos  2.6     10-22  Mg     2.1     10-22    TPro  5.6<L>  /  Alb  3.4  /  TBili  0.6  /  DBili  x   /  AST  21  /  ALT  13  /  AlkPhos  50  10-21          MEDICATIONS  (STANDING):  amLODIPine   Tablet 2.5 milliGRAM(s) Oral daily  aspirin enteric coated 81 milliGRAM(s) Oral daily  atorvastatin 40 milliGRAM(s) Oral at bedtime  chlorhexidine 2% Cloths 1 Application(s) Topical daily  clopidogrel Tablet 75 milliGRAM(s) Oral daily  dextrose 5%. 1000 milliLiter(s) (100 mL/Hr) IV Continuous <Continuous>  dextrose 5%. 1000 milliLiter(s) (50 mL/Hr) IV Continuous <Continuous>  dextrose 5%. 1000 milliLiter(s) (100 mL/Hr) IV Continuous <Continuous>  dextrose 5%. 1000 milliLiter(s) (50 mL/Hr) IV Continuous <Continuous>  dextrose 50% Injectable 25 Gram(s) IV Push once  dextrose 50% Injectable 12.5 Gram(s) IV Push once  dextrose 50% Injectable 25 Gram(s) IV Push once  dextrose 50% Injectable 25 Gram(s) IV Push once  dextrose 50% Injectable 12.5 Gram(s) IV Push once  dextrose 50% Injectable 25 Gram(s) IV Push once  glucagon  Injectable 1 milliGRAM(s) IntraMuscular once  glucagon  Injectable 1 milliGRAM(s) IntraMuscular once  heparin   Injectable 5000 Unit(s) SubCutaneous every 12 hours  influenza   Vaccine 0.5 milliLiter(s) IntraMuscular once  insulin glargine Injectable (LANTUS) 9 Unit(s) SubCutaneous at bedtime  insulin lispro (ADMELOG) corrective regimen sliding scale   SubCutaneous Before meals and at bedtime  insulin lispro Injectable (ADMELOG) 3 Unit(s) SubCutaneous three times a day before meals  metoprolol tartrate 12.5 milliGRAM(s) Oral every 12 hours  pantoprazole    Tablet 40 milliGRAM(s) Oral before breakfast  polyethylene glycol 3350 17 Gram(s) Oral daily  senna 2 Tablet(s) Oral at bedtime  sodium chloride 0.9% lock flush 3 milliLiter(s) IV Push every 8 hours  sodium chloride 0.9%. 1000 milliLiter(s) (10 mL/Hr) IV Continuous <Continuous>    MEDICATIONS  (PRN):  acetaminophen     Tablet .. 650 milliGRAM(s) Oral every 6 hours PRN Mild Pain (1 - 3)  benzocaine 15 mG/menthol 3.6 mG Lozenge 1 Lozenge Oral every 2 hours PRN Sore Throat  dextrose Oral Gel 15 Gram(s) Oral once PRN Blood Glucose LESS THAN 70 milliGRAM(s)/deciliter  dextrose Oral Gel 15 Gram(s) Oral once PRN Blood Glucose LESS THAN 70 milliGRAM(s)/deciliter  levalbuterol Inhalation 0.63 milliGRAM(s) Inhalation every 8 hours PRN wheezing  oxyCODONE    IR 5 milliGRAM(s) Oral every 4 hours PRN Moderate Pain (4 - 6)  oxyCODONE    IR 10 milliGRAM(s) Oral every 6 hours PRN Severe Pain (7 - 10)        RADIOLOGY & ADDITIONAL TESTS:

## 2022-10-22 NOTE — PROGRESS NOTE ADULT - ASSESSMENT
62 year old Cantonese-speaking male, current smoker, with PMHx HTN, uncontrolled DM-2 (A1c 14.0) and HLD (, recently started on statin), moderate b/l carotid stenosis, presented to Dr. Jerez with c/o leg pain with ambulation and DAMON. He reports occasional chest pain but does not walk much due to DAMON and leg fatigue. Exercise NST 9/2/22: hypotensive response to exercise with marked ST depression on EKG; inferior and inferolateral infarct with moderate edgardo-infarct ischemia; large anterior wall ischemia., TID 1.19, EF 38%. TTE 8/9/22: EF 40-45%, grade 1 diastolic dysfunction, akinetic basal inferolateral, basal inferior, mid inferolateral, mid inferior, and mid inferoseptal. Dyskinetic mid anterolateral. No significant valvular disease. LE arterial duplex- no hemodynamically significant stenosis. Patient was referred for cardiac catheterization revealing 95% mLAD, Diag1 50%, mLCx 100%  (with L-L collaterals), RCA subtotal 90% stenosis, dRCA 80% diffuse disease, EF 35-40% with mid/basal akinesis. EDP 9 mmHG. Patient was admitted to [facility] under the care of Dr. Graves and on 10/19 underwent uncomplicated OPCAB x3 (LIMA-LAD, SVG-PDA, Radial-OM). Patient arrived to ICU on no gtts, but Levo was added in short postop course for low BP. He was volumized and Levo was weaned. POD1 patient received continued volume resuscitation, L pleural tube was removed, and was transferred to mini-ICU. POD2 patient H/H downtrending to 7/20, repeat CBC was stable, received 1 uPRBC, monitoring Cr of 1.5. POD3 H/H uptrending, 1 more uPRBC in AM, Cr downtrending to 1.4.        Plan:    Neurovascular:   - Pain well controlled with current regimen. PRN's: tylenol, oxy    Cardiovascular:   - CAD        - s/p OPCAB x3 10/19       - Cont ASA, Plavix, Metoprolol 12.5 q12, Lipitor 40       - Radial graft: Norvasc 2.5  - Hx HTN/HLD Cont. BB, CCB, Statin  - Hemodynamically stable.   - Monitor: BP, HR, tele    Respiratory:   - Oxygenating well on room air  - Encourage continued use of IS 10x/hr and frequent ambulation  - CXR: stable, no acute infiltrates, no PTX    GI:  - GI PPX: Protonix  - PO Diet  - Bowel Regimen: Miralax, senna     Renal / :  - BUN/Cr 40/1.45 (35/1.31 yesterday)       - Continue to monitor renal function  - Monitor I/O's daily   - Replete Electrolytes PRN    Endocrine:    - Hx of DM        - A1c: 8.9 (down from 14 as outpatient)       - Started on Lantus/Lispro       - Cont. MISS       - Endocrine consulted, appreciate recs  - No Hx of thyroid dx       - TSH: 3.930    Hematologic:  - CBC: H/H- 7.4/22.1 (stable from this mornings 7.1/20.6)  - Cont. to trend CBC  - Coagulation Panel. PT/PTT/INR 12.4/35.8/1.04    ID:  - Temperature: afebrile  - CBC: WBC- 12.21  - Continue to observe for SIRS/Sepsis Syndrome.    Prophylaxis:  - DVT prophylaxis with 5000 SubQ Heparin q12h.  - Continue with SCD's b/l while patient is at rest     Disposition:  - Discharge home once patient is medically ready    62 year old Cantonese-speaking male, current smoker, with PMHx HTN, uncontrolled DM-2 (A1c 14.0) and HLD (, recently started on statin), moderate b/l carotid stenosis, presented to Dr. Jerez with c/o leg pain with ambulation and DAMON. He reports occasional chest pain but does not walk much due to DAMON and leg fatigue. Exercise NST 9/2/22: hypotensive response to exercise with marked ST depression on EKG; inferior and inferolateral infarct with moderate edgardo-infarct ischemia; large anterior wall ischemia., TID 1.19, EF 38%. TTE 8/9/22: EF 40-45%, grade 1 diastolic dysfunction, akinetic basal inferolateral, basal inferior, mid inferolateral, mid inferior, and mid inferoseptal. Dyskinetic mid anterolateral. No significant valvular disease. LE arterial duplex- no hemodynamically significant stenosis. Patient was referred for cardiac catheterization revealing 95% mLAD, Diag1 50%, mLCx 100%  (with L-L collaterals), RCA subtotal 90% stenosis, dRCA 80% diffuse disease, EF 35-40% with mid/basal akinesis. EDP 9 mmHG. Patient was admitted to [facility] under the care of Dr. Graves and on 10/19 underwent uncomplicated OPCAB x3 (LIMA-LAD, SVG-PDA, Radial-OM). Patient arrived to ICU on no gtts, but Levo was added in short postop course for low BP. He was volumized and Levo was weaned. POD1 patient received continued volume resuscitation, L pleural tube was removed, and was transferred to mini-ICU. POD2 patient H/H downtrending to 7/20, repeat CBC was stable, received 1 uPRBC, monitoring Cr of 1.5. POD3 H/H uptrending, 1 more uPRBC in AM, Cr downtrending to 1.4.        Plan:    Neurovascular:   - Pain well controlled with current regimen. PRN's: tylenol, oxy    Cardiovascular:   - CAD        - s/p OPCAB x3 10/19       - Cont ASA, Plavix, Metoprolol 12.5 q12, Lipitor 40       - Radial graft: Norvasc 2.5  - Hx HTN/HLD Cont. BB, CCB, Statin  - Hemodynamically stable.   - Monitor: BP, HR, tele    Respiratory:   - Oxygenating well on room air  - Encourage continued use of IS 10x/hr and frequent ambulation  - CXR: stable, no acute infiltrates, no PTX    GI:  - GI PPX: Protonix  - PO Diet  - Bowel Regimen: Miralax, senna     Renal / :  - BUN/Cr 39/1.43 (down from 42/1.51 yesterday)       - Continue to monitor renal function  - Monitor I/O's daily   - Replete Electrolytes PRN    Endocrine:    - Hx of DM        - A1c: 8.9 (down from 14 as outpatient)       - Cont. Lantus/Lispro       - Cont. MISS       - Endocrine consulted, appreciate recs  - No Hx of thyroid dx       - TSH: 3.930    Hematologic:  - CBC: H/H- 7.8/22.9 (up from 7.1/20.6) s/p 1uPRBC  - receiving 1 more uPRBC today  - Cont. to trend CBC  - Coagulation Panel. PT/PTT/INR 11.4/35.1/0.96    ID:  - Temperature: afebrile  - CBC: WBC- 8.06  - Continue to observe for SIRS/Sepsis Syndrome.    Prophylaxis:  - DVT prophylaxis with 5000 SubQ Heparin q12h.  - Continue with SCD's b/l while patient is at rest     Disposition:  - Discharge home once patient is medically ready

## 2022-10-23 LAB
ANION GAP SERPL CALC-SCNC: 10 MMOL/L — SIGNIFICANT CHANGE UP (ref 5–17)
BUN SERPL-MCNC: 36 MG/DL — HIGH (ref 7–23)
CALCIUM SERPL-MCNC: 8.6 MG/DL — SIGNIFICANT CHANGE UP (ref 8.4–10.5)
CHLORIDE SERPL-SCNC: 105 MMOL/L — SIGNIFICANT CHANGE UP (ref 96–108)
CO2 SERPL-SCNC: 18 MMOL/L — LOW (ref 22–31)
CREAT SERPL-MCNC: 1.25 MG/DL — SIGNIFICANT CHANGE UP (ref 0.5–1.3)
EGFR: 64 ML/MIN/1.73M2 — SIGNIFICANT CHANGE UP
GLUCOSE BLDC GLUCOMTR-MCNC: 101 MG/DL — HIGH (ref 70–99)
GLUCOSE BLDC GLUCOMTR-MCNC: 113 MG/DL — HIGH (ref 70–99)
GLUCOSE BLDC GLUCOMTR-MCNC: 129 MG/DL — HIGH (ref 70–99)
GLUCOSE BLDC GLUCOMTR-MCNC: 300 MG/DL — HIGH (ref 70–99)
GLUCOSE BLDC GLUCOMTR-MCNC: 343 MG/DL — HIGH (ref 70–99)
GLUCOSE SERPL-MCNC: 110 MG/DL — HIGH (ref 70–99)
HCT VFR BLD CALC: 27.6 % — LOW (ref 39–50)
HGB BLD-MCNC: 9.4 G/DL — LOW (ref 13–17)
MAGNESIUM SERPL-MCNC: 1.9 MG/DL — SIGNIFICANT CHANGE UP (ref 1.6–2.6)
MCHC RBC-ENTMCNC: 29.4 PG — SIGNIFICANT CHANGE UP (ref 27–34)
MCHC RBC-ENTMCNC: 34.1 GM/DL — SIGNIFICANT CHANGE UP (ref 32–36)
MCV RBC AUTO: 86.3 FL — SIGNIFICANT CHANGE UP (ref 80–100)
NRBC # BLD: 0 /100 WBCS — SIGNIFICANT CHANGE UP (ref 0–0)
PHOSPHATE SERPL-MCNC: 2.2 MG/DL — LOW (ref 2.5–4.5)
PLATELET # BLD AUTO: 148 K/UL — LOW (ref 150–400)
POTASSIUM SERPL-MCNC: 3.9 MMOL/L — SIGNIFICANT CHANGE UP (ref 3.5–5.3)
POTASSIUM SERPL-SCNC: 3.9 MMOL/L — SIGNIFICANT CHANGE UP (ref 3.5–5.3)
RBC # BLD: 3.2 M/UL — LOW (ref 4.2–5.8)
RBC # FLD: 14.3 % — SIGNIFICANT CHANGE UP (ref 10.3–14.5)
SODIUM SERPL-SCNC: 133 MMOL/L — LOW (ref 135–145)
WBC # BLD: 7.41 K/UL — SIGNIFICANT CHANGE UP (ref 3.8–10.5)
WBC # FLD AUTO: 7.41 K/UL — SIGNIFICANT CHANGE UP (ref 3.8–10.5)

## 2022-10-23 PROCEDURE — 32557 INSERT CATH PLEURA W/ IMAGE: CPT

## 2022-10-23 PROCEDURE — 99232 SBSQ HOSP IP/OBS MODERATE 35: CPT

## 2022-10-23 PROCEDURE — 71045 X-RAY EXAM CHEST 1 VIEW: CPT | Mod: 26,77,76

## 2022-10-23 PROCEDURE — 71045 X-RAY EXAM CHEST 1 VIEW: CPT | Mod: 26

## 2022-10-23 RX ORDER — INSULIN GLARGINE 100 [IU]/ML
14 INJECTION, SOLUTION SUBCUTANEOUS AT BEDTIME
Refills: 0 | Status: DISCONTINUED | OUTPATIENT
Start: 2022-10-23 | End: 2022-10-24

## 2022-10-23 RX ORDER — FUROSEMIDE 40 MG
40 TABLET ORAL DAILY
Refills: 0 | Status: DISCONTINUED | OUTPATIENT
Start: 2022-10-23 | End: 2022-10-24

## 2022-10-23 RX ORDER — MAGNESIUM OXIDE 400 MG ORAL TABLET 241.3 MG
800 TABLET ORAL ONCE
Refills: 0 | Status: COMPLETED | OUTPATIENT
Start: 2022-10-23 | End: 2022-10-23

## 2022-10-23 RX ORDER — INSULIN LISPRO 100/ML
6 VIAL (ML) SUBCUTANEOUS
Refills: 0 | Status: DISCONTINUED | OUTPATIENT
Start: 2022-10-23 | End: 2022-10-24

## 2022-10-23 RX ORDER — SODIUM,POTASSIUM PHOSPHATES 278-250MG
1 POWDER IN PACKET (EA) ORAL
Refills: 0 | Status: COMPLETED | OUTPATIENT
Start: 2022-10-23 | End: 2022-10-23

## 2022-10-23 RX ADMIN — ATORVASTATIN CALCIUM 40 MILLIGRAM(S): 80 TABLET, FILM COATED ORAL at 22:19

## 2022-10-23 RX ADMIN — MAGNESIUM OXIDE 400 MG ORAL TABLET 800 MILLIGRAM(S): 241.3 TABLET ORAL at 09:37

## 2022-10-23 RX ADMIN — HEPARIN SODIUM 5000 UNIT(S): 5000 INJECTION INTRAVENOUS; SUBCUTANEOUS at 18:27

## 2022-10-23 RX ADMIN — Medication 4 UNIT(S): at 07:29

## 2022-10-23 RX ADMIN — Medication 650 MILLIGRAM(S): at 09:30

## 2022-10-23 RX ADMIN — Medication 81 MILLIGRAM(S): at 09:37

## 2022-10-23 RX ADMIN — Medication 12.5 MILLIGRAM(S): at 18:26

## 2022-10-23 RX ADMIN — Medication 4 UNIT(S): at 13:07

## 2022-10-23 RX ADMIN — Medication 1 SPRAY(S): at 22:26

## 2022-10-23 RX ADMIN — AMLODIPINE BESYLATE 2.5 MILLIGRAM(S): 2.5 TABLET ORAL at 07:28

## 2022-10-23 RX ADMIN — INSULIN GLARGINE 14 UNIT(S): 100 INJECTION, SOLUTION SUBCUTANEOUS at 22:19

## 2022-10-23 RX ADMIN — SODIUM CHLORIDE 3 MILLILITER(S): 9 INJECTION INTRAMUSCULAR; INTRAVENOUS; SUBCUTANEOUS at 07:01

## 2022-10-23 RX ADMIN — SODIUM CHLORIDE 3 MILLILITER(S): 9 INJECTION INTRAMUSCULAR; INTRAVENOUS; SUBCUTANEOUS at 22:26

## 2022-10-23 RX ADMIN — SODIUM CHLORIDE 3 MILLILITER(S): 9 INJECTION INTRAMUSCULAR; INTRAVENOUS; SUBCUTANEOUS at 18:30

## 2022-10-23 RX ADMIN — ATORVASTATIN CALCIUM 40 MILLIGRAM(S): 80 TABLET, FILM COATED ORAL at 00:00

## 2022-10-23 RX ADMIN — Medication 1 PACKET(S): at 18:32

## 2022-10-23 RX ADMIN — Medication 6 UNIT(S): at 17:34

## 2022-10-23 RX ADMIN — Medication 12.5 MILLIGRAM(S): at 07:29

## 2022-10-23 RX ADMIN — Medication 40 MILLIGRAM(S): at 09:37

## 2022-10-23 RX ADMIN — PANTOPRAZOLE SODIUM 40 MILLIGRAM(S): 20 TABLET, DELAYED RELEASE ORAL at 07:29

## 2022-10-23 RX ADMIN — HEPARIN SODIUM 5000 UNIT(S): 5000 INJECTION INTRAVENOUS; SUBCUTANEOUS at 07:28

## 2022-10-23 RX ADMIN — OXYCODONE HYDROCHLORIDE 5 MILLIGRAM(S): 5 TABLET ORAL at 15:30

## 2022-10-23 RX ADMIN — POLYETHYLENE GLYCOL 3350 17 GRAM(S): 17 POWDER, FOR SOLUTION ORAL at 14:26

## 2022-10-23 RX ADMIN — SODIUM CHLORIDE 3 MILLILITER(S): 9 INJECTION INTRAMUSCULAR; INTRAVENOUS; SUBCUTANEOUS at 00:01

## 2022-10-23 RX ADMIN — OXYCODONE HYDROCHLORIDE 5 MILLIGRAM(S): 5 TABLET ORAL at 15:13

## 2022-10-23 RX ADMIN — CLOPIDOGREL BISULFATE 75 MILLIGRAM(S): 75 TABLET, FILM COATED ORAL at 09:37

## 2022-10-23 RX ADMIN — Medication 650 MILLIGRAM(S): at 09:36

## 2022-10-23 RX ADMIN — Medication 1 PACKET(S): at 10:32

## 2022-10-23 RX ADMIN — Medication 6: at 13:07

## 2022-10-23 RX ADMIN — CHLORHEXIDINE GLUCONATE 1 APPLICATION(S): 213 SOLUTION TOPICAL at 18:25

## 2022-10-23 NOTE — PROGRESS NOTE ADULT - ASSESSMENT
62 year old Cantonese-speaking male, current smoker, with PMHx HTN, uncontrolled DM-2 (A1c 14.0) and HLD (, recently started on statin), moderate b/l carotid stenosis, presented to Dr. Jerez with c/o leg pain with ambulation and DAMON. He reports occasional chest pain but does not walk much due to DAMON and leg fatigue. Exercise NST 9/2/22: hypotensive response to exercise with marked ST depression on EKG; inferior and inferolateral infarct with moderate edgardo-infarct ischemia; large anterior wall ischemia., TID 1.19, EF 38%. TTE 8/9/22: EF 40-45%, grade 1 diastolic dysfunction, akinetic basal inferolateral, basal inferior, mid inferolateral, mid inferior, and mid inferoseptal. Dyskinetic mid anterolateral. No significant valvular disease. LE arterial duplex- no hemodynamically significant stenosis. Patient was referred for cardiac catheterization revealing 95% mLAD, Diag1 50%, mLCx 100%  (with L-L collaterals), RCA subtotal 90% stenosis, dRCA 80% diffuse disease, EF 35-40% with mid/basal akinesis. EDP 9 mmHG. Patient was admitted to [facility] under the care of Dr. Graves and on 10/19 underwent uncomplicated OPCAB x3 (LIMA-LAD, SVG-PDA, Radial-OM). Patient arrived to ICU on no gtts, but Levo was added in short postop course for low BP. He was volumized and Levo was weaned. POD1 patient received continued volume resuscitation, L pleural tube was removed, and was transferred to mini-ICU. POD2 patient H/H downtrending to 7/20, repeat CBC was stable, received 1 uPRBC, monitoring Cr of 1.5. POD3 H/H uptrending, 1 more uPRBC in AM, Cr downtrending to 1.4.        Plan:    Neurovascular:   - Pain well controlled with current regimen. PRN's: tylenol, oxy    Cardiovascular:   - CAD        - s/p OPCAB x3 10/19       - Cont ASA, Plavix, Metoprolol 12.5 q12, Lipitor 40       - Radial graft: Norvasc 2.5  - Hx HTN/HLD Cont. BB, CCB, Statin  - Hemodynamically stable.   - Monitor: BP, HR, tele    Respiratory:   - Oxygenating well on room air  - Encourage continued use of IS 10x/hr and frequent ambulation  - CXR: stable, no acute infiltrates, no PTX    GI:  - GI PPX: Protonix  - PO Diet  - Bowel Regimen: Miralax, senna     Renal / :  - BUN/Cr 39/1.43 (down from 42/1.51 yesterday)       - Continue to monitor renal function  - Monitor I/O's daily   - Replete Electrolytes PRN    Endocrine:    - Hx of DM        - A1c: 8.9 (down from 14 as outpatient)       - Cont. Lantus/Lispro       - Cont. MISS       - Endocrine consulted, appreciate recs  - No Hx of thyroid dx       - TSH: 3.930    Hematologic:  - CBC: H/H- 7.8/22.9 (up from 7.1/20.6) s/p 1uPRBC  - receiving 1 more uPRBC today  - Cont. to trend CBC  - Coagulation Panel. PT/PTT/INR 11.4/35.1/0.96    ID:  - Temperature: afebrile  - CBC: WBC- 8.06  - Continue to observe for SIRS/Sepsis Syndrome.    Prophylaxis:  - DVT prophylaxis with 5000 SubQ Heparin q12h.  - Continue with SCD's b/l while patient is at rest     Disposition:  - Discharge home once patient is medically ready      62 year old Cantonese-speaking male, current smoker, with PMHx HTN, uncontrolled DM-2 (A1c 14.0) and HLD (, recently started on statin), moderate b/l carotid stenosis, presented to Dr. Jerez with c/o leg pain with ambulation and DAMON. He reports occasional chest pain but does not walk much due to DAMON and leg fatigue. Exercise NST 9/2/22: hypotensive response to exercise with marked ST depression on EKG; inferior and inferolateral infarct with moderate edgardo-infarct ischemia; large anterior wall ischemia., TID 1.19, EF 38%. TTE 8/9/22: EF 40-45%, grade 1 diastolic dysfunction, akinetic basal inferolateral, basal inferior, mid inferolateral, mid inferior, and mid inferoseptal. Dyskinetic mid anterolateral. No significant valvular disease. LE arterial duplex- no hemodynamically significant stenosis. Patient was referred for cardiac catheterization revealing 95% mLAD, Diag1 50%, mLCx 100%  (with L-L collaterals), RCA subtotal 90% stenosis, dRCA 80% diffuse disease, EF 35-40% with mid/basal akinesis. EDP 9 mmHG. Patient was admitted to [facility] under the care of Dr. Graves and on 10/19 underwent uncomplicated OPCAB x3 (LIMA-LAD, SVG-PDA, Radial-OM). Patient arrived to ICU on no gtts, but Levo was added in short postop course for low BP. He was volumized and Levo was weaned. POD1 patient received continued volume resuscitation, L pleural tube was removed, and was transferred to mini-ICU. POD2 patient H/H downtrending to 7/20, repeat CBC was stable, received 1 uPRBC, monitoring Cr of 1.5. POD3 H/H uptrending, 1 more uPRBC in AM, Cr downtrending to 1.4. POD4 Cr down to 1.25, Bilateral Pleural effusions on cxr in AM. Left pigtail catheter placed, draining 500cc serosanguinous fluid. Pigtail removed in afternoon.      Plan:    Neurovascular:   - Pain well controlled with current regimen. PRN's: tylenol, oxy    Cardiovascular:   - CAD        - s/p OPCAB x3 10/19       - Cont ASA, Plavix, Metoprolol 12.5 q12, Lipitor 40       - Radial graft: Norvasc 2.5  - Hx HTN/HLD Cont. BB, CCB, Statin  - Hemodynamically stable.   - Monitor: BP, HR, tele    Respiratory:   - b/l pleural effusions       - Left Pigtail placed, 500cc out, tube now removed       - US right chest in AM, not large enough to drain currently.       - Cont. Lasix   - Oxygenating well on room air  - Encourage continued use of IS 10x/hr and frequent ambulation  - CXR: stable, no acute infiltrates, no PTX    GI:  - GI PPX: Protonix  - PO Diet  - Bowel Regimen: Miralax, senna     Renal / :  - BUN/Cr 36/1.25  - Continue to monitor renal function  - Monitor I/O's daily   - Replete Electrolytes PRN    Endocrine:    - Hx of DM        - A1c: 8.9 (down from 14 as outpatient)       - Cont. Lantus/Lispro       - Cont. MISS       - Endocrine consulted, appreciate recs  - No Hx of thyroid dx       - TSH: 3.930    Hematologic:  - CBC: H/H- 9.4/27.6   - received total 2 uPRBC postop  - Cont. to trend CBC  - Coagulation Panel. PT/PTT/INR     ID:  - Temperature: afebrile  - CBC: WBC- 7.41  - Continue to observe for SIRS/Sepsis Syndrome.    Prophylaxis:  - DVT prophylaxis with 5000 SubQ Heparin q12h.  - Continue with SCD's b/l while patient is at rest     Disposition:  - Discharge home once patient is medically ready

## 2022-10-23 NOTE — PROGRESS NOTE ADULT - SUBJECTIVE AND OBJECTIVE BOX
Weekend/Overnight events  Pt seen and examined at bedside, a/b son. RN confirms pt received tray with oatmeal/fruits/syrup yogurt and cookies in AM. Appetite has dramatically improved. Lunch tray at bedside also contained fruit cup. ADvised to not consume it.       DIABETES HISTORY  Age at diagnosis: about 40 years ago, 22  How was he diagnosed: Clinic on [address]. Unable to recall further information.   Current Therapy / History of other regimens: Actos 30mg qd, metformin 1g bid, glipizide 10mg qd.  History of hypoglycemia: states symptoms of sweating when BG is low, usually around 50s.   History of DKA/HHS: denies  Home FS-80s range.   Diet:  Per wife, pt following strict diet at home consisting of vegetables, chicken, fish. Minimal rice or bread.   Activity:  Able to walk at home, restricted due to SOB.   Complications: paraesthesias of the hands and feet, denies taking any mediations. Complains of blurry vision, but unsure if its of old age or DM.    Outpatient Endo: Has not gone to a endo provider last 15 years, but recently has gone to Dr. Roman. Wife states that sugar levels have gone down once pt started taking medications.     PAST MEDICAL & SURGICAL HISTORY: as per HPI.    FAMILY HISTORY  DM: Mother   Thyroid: negative  Autoimmune: negative  Other:    SOCIAL HISTORY  Work: currently not working   Smoking: current smoker, 1/2 pack for 47 years  Etoh: denies  Recreational Drugs: denies    ALLERGIES  No Known Allergies    CURRENT MEDICATIONS  acetaminophen     Tablet .. 650 milliGRAM(s) Oral every 6 hours PRN  amLODIPine   Tablet 2.5 milliGRAM(s) Oral daily  aspirin enteric coated 81 milliGRAM(s) Oral daily  atorvastatin 40 milliGRAM(s) Oral at bedtime  benzocaine 15 mG/menthol 3.6 mG Lozenge 1 Lozenge Oral every 2 hours PRN  chlorhexidine 2% Cloths 1 Application(s) Topical daily  clopidogrel Tablet 75 milliGRAM(s) Oral daily  dextrose 5%. 1000 milliLiter(s) IV Continuous <Continuous>  dextrose 5%. 1000 milliLiter(s) IV Continuous <Continuous>  dextrose 5%. 1000 milliLiter(s) IV Continuous <Continuous>  dextrose 5%. 1000 milliLiter(s) IV Continuous <Continuous>  dextrose 50% Injectable 25 Gram(s) IV Push once  dextrose 50% Injectable 12.5 Gram(s) IV Push once  dextrose 50% Injectable 25 Gram(s) IV Push once  dextrose 50% Injectable 25 Gram(s) IV Push once  dextrose 50% Injectable 12.5 Gram(s) IV Push once  dextrose 50% Injectable 25 Gram(s) IV Push once  dextrose Oral Gel 15 Gram(s) Oral once PRN  dextrose Oral Gel 15 Gram(s) Oral once PRN  glucagon  Injectable 1 milliGRAM(s) IntraMuscular once  glucagon  Injectable 1 milliGRAM(s) IntraMuscular once  heparin   Injectable 5000 Unit(s) SubCutaneous every 12 hours  influenza   Vaccine 0.5 milliLiter(s) IntraMuscular once  insulin glargine Injectable (LANTUS) 9 Unit(s) SubCutaneous at bedtime  insulin lispro (ADMELOG) corrective regimen sliding scale   SubCutaneous Before meals and at bedtime  insulin lispro Injectable (ADMELOG) 3 Unit(s) SubCutaneous three times a day before meals  metoprolol tartrate 12.5 milliGRAM(s) Oral every 12 hours  oxyCODONE    IR 5 milliGRAM(s) Oral every 4 hours PRN  oxyCODONE    IR 10 milliGRAM(s) Oral every 6 hours PRN  pantoprazole    Tablet 40 milliGRAM(s) Oral before breakfast  polyethylene glycol 3350 17 Gram(s) Oral daily  senna 2 Tablet(s) Oral at bedtime  sodium chloride 0.9% lock flush 3 milliLiter(s) IV Push every 8 hours  sodium chloride 0.9%. 1000 milliLiter(s) IV Continuous <Continuous>    REVIEW OF SYSTEMS  Constitutional:  Negative fever, chills or loss of appetite. Positive for fatigue.   Eyes:  Negative blurry vision or double vision.  Cardiovascular:  Negative for chest pain, but states of history of chest pain. negative for palpations.   Respiratory:  Negative for cough, wheezing. Positive for dyspnea on exertaion.   Gastrointestinal:  Negative for nausea, vomiting, diarrhea, constipation, or abdominal pain.  Genitourinary:  Negative frequency, urgency or dysuria.  Neurologic:  No headache, confusion, dizziness, lightheadedness.      PHYSICAL EXAM  Vital Signs Last 24 Hrs  T(C): 37.6 (21 Oct 2022 09:18), Max: 37.6 (20 Oct 2022 17:27)  T(F): 99.6 (21 Oct 2022 09:18), Max: 99.6 (20 Oct 2022 17:27)  HR: 89 (21 Oct 2022 09:00) (89 - 105)  BP: 104/62 (21 Oct 2022 08:00) (104/62 - 104/62)  BP(mean): 79 (21 Oct 2022 08:00) (79 - 79)  RR: 18 (21 Oct 2022 09:00) (17 - 20)  SpO2: 93% (21 Oct 2022 09:00) (88% - 97%)    Parameters below as of 21 Oct 2022 09:00  Patient On (Oxygen Delivery Method): nasal cannula w/ humidification  O2 Flow (L/min): 4    Height (cm): 162.6 (10-19 @ 11:44)  Weight (kg): 44.5 (10-19 @ 11:44)  BMI (kg/m2): 16.8 (10-19 @ 11:44)    Constitutional	well-groomed; no distress  Eyes	PERRL; EOMI; conjunctiva clear  ENMT	no gross abnormalities  Respiratory	clear to auscultation bilaterally; no wheezes; no rales; no rhonchi; no respiratory distress; no use of accessory muscles; respirations non-labored  Cardiovascular	regular rate and rhythm; S1 S2 present; no murmur; no JVD; no pedal edema; vascular  Radial Pulse	right normal; left normal; 2+ Bilaterally  Femoral Pulse	right normal; left normal; 2+ Bilaterally-No bruits  DP Pulse	right normal; left normal; 2+ Bilaterally  PT Pulse	Faint to 1+ Bilaterally  Gastrointestinal	soft; nontender; nondistended; normal active bowel sounds  Neurological	cranial nerves II-XII intact; responds to pain; responds to verbal commands  Musculoskeletal	strength 5/5 bilateral upper extremities; strength 5/5 bilateral lower extremities  Psychiatric	normal affect; alert and oriented x3; normal behavior    LABS                        7.4    11.98 )-----------( 165      ( 21 Oct 2022 10:54 )             22.1     10-    137  |  105  |  40<H>  ----------------------------<  218<H>  4.1   |  18<L>  |  1.45<H>    Ca    9.0      21 Oct 2022 06:20  Phos  2.7     10-  Mg     2.0     10-    TPro  5.7<L>  /  Alb  3.6  /  TBili  0.7  /  DBili  x   /  AST  26  /  ALT  17  /  AlkPhos  50  10-21    LIVER FUNCTIONS - ( 21 Oct 2022 06:20 )  Alb: 3.6 g/dL / Pro: 5.7 g/dL / ALK PHOS: 50 U/L / ALT: 17 U/L / AST: 26 U/L / GGT: x         PT/INR - ( 21 Oct 2022 06:20 )   PT: 12.4 sec;   INR: 1.04     PTT - ( 21 Oct 2022 06:20 )  PTT:35.8 sec  Thyroid Stimulating Hormone, Serum: 3.930 (10-03-22)      CAPILLARY BLOOD GLUCOSE & INSULIN RECEIVED  Yesterday  - Dinner FS mg/dL = 0 units of premeal Lispro + 2 units of Lispro sliding scale.   - Bedtime FS mg/dL = 0 units of Lantus + 4 units Lispro sliding scale.     Today  - Breakfast FS mg/dL = 0 units of premeal Lispro + 4 units of Lispro sliding scale.   - Lunch FS mg/dL = 3 units of premeal Lispro + 4 units of Lispro sliding scale.     CAPILLARY BLOOD GLUCOSE  CAPILLARY BLOOD GLUCOSE      POCT Blood Glucose.: 300 mg/dL (23 Oct 2022 12:57)  POCT Blood Glucose.: 343 mg/dL (23 Oct 2022 11:50)  POCT Blood Glucose.: 113 mg/dL (23 Oct 2022 07:23)  POCT Blood Glucose.: 220 mg/dL (22 Oct 2022 21:52)  POCT Blood Glucose.: 159 mg/dL (22 Oct 2022 16:56)

## 2022-10-23 NOTE — PROGRESS NOTE ADULT - ASSESSMENT
Mr. Kan a 62 year old male with stable angina, positive stress test and 3-vessel CAD  presents for surgical intervention. S/p OPCAB (SVG and left Radial grafts). Pt has history of non-compliance for the last 15 years, but since last 3 months has been following strict diet control and has been taking diabetes medications.       A1C: 8.9  Weight: 44.50kg  BMI: 16.8  Creatinine: 1.45  GFR: 54  Ejection Fraction: 45% 10/19      # Type 2 diabetes mellitus  - Please increase lantus 14 units at bedtime.   - Increase lispro to 6 units before each meal.  - Continue lispro moderate dose sliding scale four times daily with meals and at bedtime.  - RN advised to reach out to nutrition/kitchen and make sure tray is consistent CHO diet. This was reinforced to family.  - Patient's fingerstick glucose goal is 100-180 mg/dL.    -Educated pt on the importance of consistent carb diet while on insulin. Advise pt of possible meal items to order to incorporate healthy carbs in meals.       - Patient can follow up at discharge with [facility] by calling [phone] to make an appointment.        Will continue to monitor.     Service Pager: [phone]

## 2022-10-23 NOTE — PROGRESS NOTE ADULT - SUBJECTIVE AND OBJECTIVE BOX
Patient discussed on morning rounds with Dr. Sterling     Operation / Date: 10/19 Opcab x 3 LIMA-LAD, SVG-PDA, Radial- OM EF 40-45%    SUBJECTIVE ASSESSMENT:  62y Male seen at bedside today. Patients pain has improved. Walking more. Denies any chest pain, palpitations, shortness of breath, wheezing, abd pain, nausea, vomiting, lightheadedness, headaches, fevers, or chills.       Vital Signs Last 24 Hrs  T(C): 36.5 (23 Oct 2022 01:01), Max: 37 (22 Oct 2022 17:49)  T(F): 97.7 (23 Oct 2022 01:01), Max: 98.6 (22 Oct 2022 17:49)  HR: 86 (23 Oct 2022 00:56) (86 - 96)  BP: 118/80 (23 Oct 2022 04:56) (118/71 - 138/81)  BP(mean): 94 (23 Oct 2022 04:56) (89 - 102)  RR: 20 (23 Oct 2022 04:56) (16 - 22)  SpO2: 93% (23 Oct 2022 04:56) (93% - 95%)    Parameters below as of 22 Oct 2022 17:00  Patient On (Oxygen Delivery Method): room air      I&O's Detail    22 Oct 2022 07:01  -  23 Oct 2022 07:00  --------------------------------------------------------  IN:    Oral Fluid: 600 mL    PRBCs (Packed Red Blood Cells): 300 mL  Total IN: 900 mL    OUT:    Voided (mL): 825 mL  Total OUT: 825 mL    Total NET: 75 mL      CHEST TUBE:  No.   SHERI DRAIN:  No.  EPICARDIAL WIRES: Yes.  TIE DOWNS: Yes.  GREER: No.    PHYSICAL EXAM:  General: Well appearing, laying in bed on an incline, NAD  Neurological: AOx3, no focal neurological deficits  Cardiovascular: Regular Rate/Rhythm, S1/2 auscultated, No extra heart sounds  Respiratory: CTA b/l over all lung fields, No adventitious breath sounds  Gastrointestinal: Bowel sounds present in all 4 quadrants, Abdomen is soft, non-tender, non-distended  Extremities: No peripheral edema noted, 5/5 strength and full range of motion in all 4 extremities b/l  Vascular: 2+ peripheral pulses b/l in upper and lower extremities, Radial, DP  Incision Sites: MSI, EVH, Radial, CT sites all c/d/i, no erythema, no purulence. No sternal click.     LABS:                        9.4    7.41  )-----------( 148      ( 23 Oct 2022 05:30 )             27.6       COUMADIN:  Yes/No. REASON: .    PT/INR - ( 22 Oct 2022 08:00 )   PT: 11.4 sec;   INR: 0.96          PTT - ( 22 Oct 2022 08:00 )  PTT:35.1 sec    10-23    133<L>  |  105  |  36<H>  ----------------------------<  110<H>  3.9   |  18<L>  |  1.25    Ca    8.6      23 Oct 2022 05:30  Phos  2.2     10-23  Mg     1.9     10-23    TPro  5.9<L>  /  Alb  3.1<L>  /  TBili  1.0  /  DBili  x   /  AST  30  /  ALT  16  /  AlkPhos  81  10-22      MEDICATIONS  (STANDING):  amLODIPine   Tablet 2.5 milliGRAM(s) Oral daily  aspirin enteric coated 81 milliGRAM(s) Oral daily  atorvastatin 40 milliGRAM(s) Oral at bedtime  chlorhexidine 2% Cloths 1 Application(s) Topical daily  clopidogrel Tablet 75 milliGRAM(s) Oral daily  dextrose 5%. 1000 milliLiter(s) (50 mL/Hr) IV Continuous <Continuous>  dextrose 5%. 1000 milliLiter(s) (100 mL/Hr) IV Continuous <Continuous>  dextrose 5%. 1000 milliLiter(s) (50 mL/Hr) IV Continuous <Continuous>  dextrose 5%. 1000 milliLiter(s) (100 mL/Hr) IV Continuous <Continuous>  dextrose 50% Injectable 25 Gram(s) IV Push once  dextrose 50% Injectable 12.5 Gram(s) IV Push once  dextrose 50% Injectable 25 Gram(s) IV Push once  dextrose 50% Injectable 25 Gram(s) IV Push once  dextrose 50% Injectable 12.5 Gram(s) IV Push once  dextrose 50% Injectable 25 Gram(s) IV Push once  glucagon  Injectable 1 milliGRAM(s) IntraMuscular once  glucagon  Injectable 1 milliGRAM(s) IntraMuscular once  heparin   Injectable 5000 Unit(s) SubCutaneous every 12 hours  influenza   Vaccine 0.5 milliLiter(s) IntraMuscular once  insulin glargine Injectable (LANTUS) 12 Unit(s) SubCutaneous at bedtime  insulin lispro (ADMELOG) corrective regimen sliding scale   SubCutaneous Before meals and at bedtime  insulin lispro Injectable (ADMELOG) 4 Unit(s) SubCutaneous three times a day before meals  magnesium oxide 800 milliGRAM(s) Oral once  metoprolol tartrate 12.5 milliGRAM(s) Oral every 12 hours  pantoprazole    Tablet 40 milliGRAM(s) Oral before breakfast  polyethylene glycol 3350 17 Gram(s) Oral daily  potassium phosphate / sodium phosphate Powder (PHOS-NaK) 1 Packet(s) Oral two times a day before meals  senna 2 Tablet(s) Oral at bedtime  sodium chloride 0.65% Nasal 1 Spray(s) Both Nostrils every 8 hours  sodium chloride 0.9% lock flush 3 milliLiter(s) IV Push every 8 hours  sodium chloride 0.9%. 1000 milliLiter(s) (10 mL/Hr) IV Continuous <Continuous>    MEDICATIONS  (PRN):  acetaminophen     Tablet .. 650 milliGRAM(s) Oral every 6 hours PRN Mild Pain (1 - 3)  benzocaine 15 mG/menthol 3.6 mG Lozenge 1 Lozenge Oral every 2 hours PRN Sore Throat  dextrose Oral Gel 15 Gram(s) Oral once PRN Blood Glucose LESS THAN 70 milliGRAM(s)/deciliter  dextrose Oral Gel 15 Gram(s) Oral once PRN Blood Glucose LESS THAN 70 milliGRAM(s)/deciliter  levalbuterol Inhalation 0.63 milliGRAM(s) Inhalation every 8 hours PRN wheezing  oxyCODONE    IR 5 milliGRAM(s) Oral every 4 hours PRN Moderate Pain (4 - 6)  oxyCODONE    IR 10 milliGRAM(s) Oral every 6 hours PRN Severe Pain (7 - 10)        RADIOLOGY & ADDITIONAL TESTS:  < from: Xray Chest 1 View- PORTABLE-Routine (Xray Chest 1 View- PORTABLE-Routine in AM.) (10.22.22 @ 05:32) >  Frontal examination of the chest demonstrates patient to be status post   sternotomy. Cardiomegaly. No interval change lung pathology in comparison   to prior examination of the chest 10/21/2022.    IMPRESSION: No interval change lung pathology    < end of copied text >     Patient discussed on morning rounds with Dr. Sterling     Operation / Date: 10/19 Opcab x 3 LIMA-LAD, SVG-PDA, Radial- OM EF 40-45%    SUBJECTIVE ASSESSMENT:  62y Male seen at bedside today. Patients pain has improved. Walking more. Denies any chest pain, palpitations, shortness of breath, wheezing, abd pain, nausea, vomiting, lightheadedness, headaches, fevers, or chills.       Vital Signs Last 24 Hrs  T(C): 36.5 (23 Oct 2022 01:01), Max: 37 (22 Oct 2022 17:49)  T(F): 97.7 (23 Oct 2022 01:01), Max: 98.6 (22 Oct 2022 17:49)  HR: 86 (23 Oct 2022 00:56) (86 - 96)  BP: 118/80 (23 Oct 2022 04:56) (118/71 - 138/81)  BP(mean): 94 (23 Oct 2022 04:56) (89 - 102)  RR: 20 (23 Oct 2022 04:56) (16 - 22)  SpO2: 93% (23 Oct 2022 04:56) (93% - 95%)    Parameters below as of 22 Oct 2022 17:00  Patient On (Oxygen Delivery Method): room air      I&O's Detail    22 Oct 2022 07:01  -  23 Oct 2022 07:00  --------------------------------------------------------  IN:    Oral Fluid: 600 mL    PRBCs (Packed Red Blood Cells): 300 mL  Total IN: 900 mL    OUT:    Voided (mL): 825 mL  Total OUT: 825 mL    Total NET: 75 mL      CHEST TUBE:  No.   SHERI DRAIN:  No.  EPICARDIAL WIRES: Yes.  TIE DOWNS: Yes.  GREER: No.    PHYSICAL EXAM:  General: Well appearing, laying in bed on an incline, NAD  Neurological: AOx3, no focal neurological deficits  Cardiovascular: Regular Rate/Rhythm, S1/2 auscultated, No extra heart sounds  Respiratory: CTA b/l over all lung fields, No adventitious breath sounds  Gastrointestinal: Bowel sounds present in all 4 quadrants, Abdomen is soft, non-tender, non-distended  Extremities: No peripheral edema noted, 5/5 strength and full range of motion in all 4 extremities b/l  Vascular: 2+ peripheral pulses b/l in upper and lower extremities, Radial, DP  Incision Sites: MSI, EVH, Radial, CT sites all c/d/i, no erythema, no purulence. No sternal click.     LABS:                        9.4    7.41  )-----------( 148      ( 23 Oct 2022 05:30 )             27.6       COUMADIN:  No.    PT/INR - ( 22 Oct 2022 08:00 )   PT: 11.4 sec;   INR: 0.96          PTT - ( 22 Oct 2022 08:00 )  PTT:35.1 sec    10-23    133<L>  |  105  |  36<H>  ----------------------------<  110<H>  3.9   |  18<L>  |  1.25    Ca    8.6      23 Oct 2022 05:30  Phos  2.2     10-23  Mg     1.9     10-23    TPro  5.9<L>  /  Alb  3.1<L>  /  TBili  1.0  /  DBili  x   /  AST  30  /  ALT  16  /  AlkPhos  81  10-22      MEDICATIONS  (STANDING):  amLODIPine   Tablet 2.5 milliGRAM(s) Oral daily  aspirin enteric coated 81 milliGRAM(s) Oral daily  atorvastatin 40 milliGRAM(s) Oral at bedtime  chlorhexidine 2% Cloths 1 Application(s) Topical daily  clopidogrel Tablet 75 milliGRAM(s) Oral daily  dextrose 5%. 1000 milliLiter(s) (50 mL/Hr) IV Continuous <Continuous>  dextrose 5%. 1000 milliLiter(s) (100 mL/Hr) IV Continuous <Continuous>  dextrose 5%. 1000 milliLiter(s) (50 mL/Hr) IV Continuous <Continuous>  dextrose 5%. 1000 milliLiter(s) (100 mL/Hr) IV Continuous <Continuous>  dextrose 50% Injectable 25 Gram(s) IV Push once  dextrose 50% Injectable 12.5 Gram(s) IV Push once  dextrose 50% Injectable 25 Gram(s) IV Push once  dextrose 50% Injectable 25 Gram(s) IV Push once  dextrose 50% Injectable 12.5 Gram(s) IV Push once  dextrose 50% Injectable 25 Gram(s) IV Push once  glucagon  Injectable 1 milliGRAM(s) IntraMuscular once  glucagon  Injectable 1 milliGRAM(s) IntraMuscular once  heparin   Injectable 5000 Unit(s) SubCutaneous every 12 hours  influenza   Vaccine 0.5 milliLiter(s) IntraMuscular once  insulin glargine Injectable (LANTUS) 12 Unit(s) SubCutaneous at bedtime  insulin lispro (ADMELOG) corrective regimen sliding scale   SubCutaneous Before meals and at bedtime  insulin lispro Injectable (ADMELOG) 4 Unit(s) SubCutaneous three times a day before meals  magnesium oxide 800 milliGRAM(s) Oral once  metoprolol tartrate 12.5 milliGRAM(s) Oral every 12 hours  pantoprazole    Tablet 40 milliGRAM(s) Oral before breakfast  polyethylene glycol 3350 17 Gram(s) Oral daily  potassium phosphate / sodium phosphate Powder (PHOS-NaK) 1 Packet(s) Oral two times a day before meals  senna 2 Tablet(s) Oral at bedtime  sodium chloride 0.65% Nasal 1 Spray(s) Both Nostrils every 8 hours  sodium chloride 0.9% lock flush 3 milliLiter(s) IV Push every 8 hours  sodium chloride 0.9%. 1000 milliLiter(s) (10 mL/Hr) IV Continuous <Continuous>    MEDICATIONS  (PRN):  acetaminophen     Tablet .. 650 milliGRAM(s) Oral every 6 hours PRN Mild Pain (1 - 3)  benzocaine 15 mG/menthol 3.6 mG Lozenge 1 Lozenge Oral every 2 hours PRN Sore Throat  dextrose Oral Gel 15 Gram(s) Oral once PRN Blood Glucose LESS THAN 70 milliGRAM(s)/deciliter  dextrose Oral Gel 15 Gram(s) Oral once PRN Blood Glucose LESS THAN 70 milliGRAM(s)/deciliter  levalbuterol Inhalation 0.63 milliGRAM(s) Inhalation every 8 hours PRN wheezing  oxyCODONE    IR 5 milliGRAM(s) Oral every 4 hours PRN Moderate Pain (4 - 6)  oxyCODONE    IR 10 milliGRAM(s) Oral every 6 hours PRN Severe Pain (7 - 10)        RADIOLOGY & ADDITIONAL TESTS:  < from: Xray Chest 1 View- PORTABLE-Routine (Xray Chest 1 View- PORTABLE-Routine in AM.) (10.22.22 @ 05:32) >  Frontal examination of the chest demonstrates patient to be status post   sternotomy. Cardiomegaly. No interval change lung pathology in comparison   to prior examination of the chest 10/21/2022.    IMPRESSION: No interval change lung pathology    < end of copied text >

## 2022-10-24 ENCOUNTER — TRANSCRIPTION ENCOUNTER (OUTPATIENT)
Age: 62
End: 2022-10-24

## 2022-10-24 VITALS — TEMPERATURE: 98 F

## 2022-10-24 PROBLEM — I25.119 ATHEROSCLEROTIC HEART DISEASE OF NATIVE CORONARY ARTERY WITH UNSPECIFIED ANGINA PECTORIS: Chronic | Status: ACTIVE | Noted: 2022-10-17

## 2022-10-24 LAB
ANION GAP SERPL CALC-SCNC: 11 MMOL/L — SIGNIFICANT CHANGE UP (ref 5–17)
APTT BLD: 30.4 SEC — SIGNIFICANT CHANGE UP (ref 27.5–35.5)
BUN SERPL-MCNC: 35 MG/DL — HIGH (ref 7–23)
CALCIUM SERPL-MCNC: 8.8 MG/DL — SIGNIFICANT CHANGE UP (ref 8.4–10.5)
CHLORIDE SERPL-SCNC: 102 MMOL/L — SIGNIFICANT CHANGE UP (ref 96–108)
CO2 SERPL-SCNC: 21 MMOL/L — LOW (ref 22–31)
CREAT SERPL-MCNC: 1.34 MG/DL — HIGH (ref 0.5–1.3)
EGFR: 59 ML/MIN/1.73M2 — LOW
GLUCOSE BLDC GLUCOMTR-MCNC: 141 MG/DL — HIGH (ref 70–99)
GLUCOSE BLDC GLUCOMTR-MCNC: 160 MG/DL — HIGH (ref 70–99)
GLUCOSE SERPL-MCNC: 144 MG/DL — HIGH (ref 70–99)
HCT VFR BLD CALC: 27.4 % — LOW (ref 39–50)
HGB BLD-MCNC: 9.5 G/DL — LOW (ref 13–17)
INR BLD: 0.88 — SIGNIFICANT CHANGE UP (ref 0.88–1.16)
MAGNESIUM SERPL-MCNC: 1.8 MG/DL — SIGNIFICANT CHANGE UP (ref 1.6–2.6)
MCHC RBC-ENTMCNC: 29.5 PG — SIGNIFICANT CHANGE UP (ref 27–34)
MCHC RBC-ENTMCNC: 34.7 GM/DL — SIGNIFICANT CHANGE UP (ref 32–36)
MCV RBC AUTO: 85.1 FL — SIGNIFICANT CHANGE UP (ref 80–100)
NRBC # BLD: 0 /100 WBCS — SIGNIFICANT CHANGE UP (ref 0–0)
PHOSPHATE SERPL-MCNC: 3.7 MG/DL — SIGNIFICANT CHANGE UP (ref 2.5–4.5)
PLATELET # BLD AUTO: 171 K/UL — SIGNIFICANT CHANGE UP (ref 150–400)
POTASSIUM SERPL-MCNC: 4 MMOL/L — SIGNIFICANT CHANGE UP (ref 3.5–5.3)
POTASSIUM SERPL-SCNC: 4 MMOL/L — SIGNIFICANT CHANGE UP (ref 3.5–5.3)
PROTHROM AB SERPL-ACNC: 10.5 SEC — SIGNIFICANT CHANGE UP (ref 10.5–13.4)
RBC # BLD: 3.22 M/UL — LOW (ref 4.2–5.8)
RBC # FLD: 14.4 % — SIGNIFICANT CHANGE UP (ref 10.3–14.5)
SODIUM SERPL-SCNC: 134 MMOL/L — LOW (ref 135–145)
WBC # BLD: 6.16 K/UL — SIGNIFICANT CHANGE UP (ref 3.8–10.5)
WBC # FLD AUTO: 6.16 K/UL — SIGNIFICANT CHANGE UP (ref 3.8–10.5)

## 2022-10-24 PROCEDURE — 86901 BLOOD TYPING SEROLOGIC RH(D): CPT

## 2022-10-24 PROCEDURE — C1889: CPT

## 2022-10-24 PROCEDURE — 86900 BLOOD TYPING SEROLOGIC ABO: CPT

## 2022-10-24 PROCEDURE — P9016: CPT

## 2022-10-24 PROCEDURE — 82330 ASSAY OF CALCIUM: CPT

## 2022-10-24 PROCEDURE — 80048 BASIC METABOLIC PNL TOTAL CA: CPT

## 2022-10-24 PROCEDURE — 85025 COMPLETE CBC W/AUTO DIFF WBC: CPT

## 2022-10-24 PROCEDURE — 71045 X-RAY EXAM CHEST 1 VIEW: CPT | Mod: 26

## 2022-10-24 PROCEDURE — 85610 PROTHROMBIN TIME: CPT

## 2022-10-24 PROCEDURE — 93005 ELECTROCARDIOGRAM TRACING: CPT

## 2022-10-24 PROCEDURE — 82947 ASSAY GLUCOSE BLOOD QUANT: CPT

## 2022-10-24 PROCEDURE — 85027 COMPLETE CBC AUTOMATED: CPT

## 2022-10-24 PROCEDURE — 84100 ASSAY OF PHOSPHORUS: CPT

## 2022-10-24 PROCEDURE — C1751: CPT

## 2022-10-24 PROCEDURE — 85730 THROMBOPLASTIN TIME PARTIAL: CPT

## 2022-10-24 PROCEDURE — 82962 GLUCOSE BLOOD TEST: CPT

## 2022-10-24 PROCEDURE — 84295 ASSAY OF SERUM SODIUM: CPT

## 2022-10-24 PROCEDURE — 36430 TRANSFUSION BLD/BLD COMPNT: CPT

## 2022-10-24 PROCEDURE — 86850 RBC ANTIBODY SCREEN: CPT

## 2022-10-24 PROCEDURE — 83735 ASSAY OF MAGNESIUM: CPT

## 2022-10-24 PROCEDURE — 80053 COMPREHEN METABOLIC PANEL: CPT

## 2022-10-24 PROCEDURE — 86923 COMPATIBILITY TEST ELECTRIC: CPT

## 2022-10-24 PROCEDURE — 94640 AIRWAY INHALATION TREATMENT: CPT

## 2022-10-24 PROCEDURE — P9045: CPT

## 2022-10-24 PROCEDURE — 85014 HEMATOCRIT: CPT

## 2022-10-24 PROCEDURE — 84132 ASSAY OF SERUM POTASSIUM: CPT

## 2022-10-24 PROCEDURE — 82803 BLOOD GASES ANY COMBINATION: CPT

## 2022-10-24 PROCEDURE — 36415 COLL VENOUS BLD VENIPUNCTURE: CPT

## 2022-10-24 PROCEDURE — 71045 X-RAY EXAM CHEST 1 VIEW: CPT

## 2022-10-24 PROCEDURE — 86891 AUTOLOGOUS BLOOD OP SALVAGE: CPT

## 2022-10-24 RX ORDER — PANTOPRAZOLE SODIUM 20 MG/1
1 TABLET, DELAYED RELEASE ORAL
Qty: 30 | Refills: 0
Start: 2022-10-24 | End: 2022-11-22

## 2022-10-24 RX ORDER — LOSARTAN POTASSIUM 100 MG/1
1 TABLET, FILM COATED ORAL
Qty: 0 | Refills: 0 | DISCHARGE

## 2022-10-24 RX ORDER — SITAGLIPTIN 50 MG/1
1 TABLET, FILM COATED ORAL
Qty: 30 | Refills: 0
Start: 2022-10-24 | End: 2022-11-22

## 2022-10-24 RX ORDER — ASPIRIN/CALCIUM CARB/MAGNESIUM 324 MG
1 TABLET ORAL
Qty: 30 | Refills: 0
Start: 2022-10-24 | End: 2022-11-22

## 2022-10-24 RX ORDER — CLOPIDOGREL BISULFATE 75 MG/1
1 TABLET, FILM COATED ORAL
Qty: 30 | Refills: 0
Start: 2022-10-24 | End: 2022-11-22

## 2022-10-24 RX ORDER — AMLODIPINE BESYLATE 2.5 MG/1
1 TABLET ORAL
Qty: 30 | Refills: 0
Start: 2022-10-24 | End: 2022-11-22

## 2022-10-24 RX ORDER — FUROSEMIDE 40 MG
1 TABLET ORAL
Qty: 7 | Refills: 0
Start: 2022-10-24 | End: 2022-10-30

## 2022-10-24 RX ORDER — MAGNESIUM OXIDE 400 MG ORAL TABLET 241.3 MG
800 TABLET ORAL ONCE
Refills: 0 | Status: COMPLETED | OUTPATIENT
Start: 2022-10-24 | End: 2022-10-24

## 2022-10-24 RX ADMIN — Medication 40 MILLIGRAM(S): at 06:02

## 2022-10-24 RX ADMIN — CHLORHEXIDINE GLUCONATE 1 APPLICATION(S): 213 SOLUTION TOPICAL at 13:10

## 2022-10-24 RX ADMIN — Medication 1 SPRAY(S): at 06:17

## 2022-10-24 RX ADMIN — PANTOPRAZOLE SODIUM 40 MILLIGRAM(S): 20 TABLET, DELAYED RELEASE ORAL at 06:03

## 2022-10-24 RX ADMIN — Medication 6 UNIT(S): at 07:11

## 2022-10-24 RX ADMIN — CLOPIDOGREL BISULFATE 75 MILLIGRAM(S): 75 TABLET, FILM COATED ORAL at 11:17

## 2022-10-24 RX ADMIN — Medication 2: at 12:00

## 2022-10-24 RX ADMIN — MAGNESIUM OXIDE 400 MG ORAL TABLET 800 MILLIGRAM(S): 241.3 TABLET ORAL at 09:42

## 2022-10-24 RX ADMIN — HEPARIN SODIUM 5000 UNIT(S): 5000 INJECTION INTRAVENOUS; SUBCUTANEOUS at 06:03

## 2022-10-24 RX ADMIN — Medication 6 UNIT(S): at 12:59

## 2022-10-24 RX ADMIN — SODIUM CHLORIDE 3 MILLILITER(S): 9 INJECTION INTRAMUSCULAR; INTRAVENOUS; SUBCUTANEOUS at 06:17

## 2022-10-24 RX ADMIN — Medication 81 MILLIGRAM(S): at 11:17

## 2022-10-24 RX ADMIN — AMLODIPINE BESYLATE 2.5 MILLIGRAM(S): 2.5 TABLET ORAL at 06:02

## 2022-10-24 RX ADMIN — Medication 12.5 MILLIGRAM(S): at 06:03

## 2022-10-24 NOTE — DISCHARGE NOTE PROVIDER - PROVIDER TOKENS
PROVIDER:[TOKEN:[[id number]],FOLLOWUP:[1 week]],PROVIDER:[TOKEN:[[id number]],FOLLOWUP:[1 month]],FREE:[LAST:[[facility]],PHONE:[phone]],FAX:[(   )    -],ADDRESS:[[address] [place] [address]],FOLLOWUP:[2 weeks]]

## 2022-10-24 NOTE — DISCHARGE NOTE PROVIDER - HOSPITAL COURSE
62 year old Cantonese-speaking male, current smoker, with PMHx HTN, uncontrolled DM-2 (A1c 14.0) and HLD (, recently started on statin), moderate b/l carotid stenosis, presented to Dr. Jerez with c/o leg pain with ambulation and DAMON. He reports occasional chest pain but does not walk much due to DAMON and leg fatigue. Exercise NST 9/2/22: hypotensive response to exercise with marked ST depression on EKG; inferior and inferolateral infarct with moderate edgardo-infarct ischemia; large anterior wall ischemia., TID 1.19, EF 38%. TTE 8/9/22: EF 40-45%, grade 1 diastolic dysfunction, akinetic basal inferolateral, basal inferior, mid inferolateral, mid inferior, and mid inferoseptal. Dyskinetic mid anterolateral. No significant valvular disease. Patient was referred for cardiac catheterization revealing 95% mLAD, Diag1 50%, mLCx 100%  (with L-L collaterals), RCA subtotal 90% stenosis, dRCA 80% diffuse disease, EF 35-40% with mid/basal akinesis. Patient was admitted to [facility] under the care of Dr. Graves and on 10/19 underwent uncomplicated OPCAB x3 (LIMA-LAD, SVG-PDA, Radial-OM). Patient arrived to ICU on no gtts, but Levo was added in short postop course for low BP. He was volumized and Levo was weaned. POD1 patient received continued volume resuscitation, L pleural tube was removed, and was transferred to mini-ICU. POD2 patient H/H downtrending to 7/20, repeat CBC was stable, received 1 uPRBC, monitoring Cr of 1.5. POD3 H/H uptrending, 1 more uPRBC in AM, Cr downtrending to 1.4. POD4 Cr down to 1.25, Bilateral Pleural effusions on cxr in AM. Left pigtail catheter placed, draining 500cc serosanguinous fluid. Pigtail removed in afternoon. POD4 pt feeling well, no acute complaints today. Improvement of pleural effusion on ptx. Pt feels ready for discharge. The discharge plan was discussed with Dr. Graves on rounds.     Over 35 minutes was spent with the patient reviewing the discharge material including medications, follow up appointments, recovery, concerning symptoms, and how to contact their health care providers if they have questions    CARDIAC SURGERY DISCHARGE CHECKLIST:      CABG        [ x] Aspirin, [  ] Contraindicated, Reason_______________________________        [ x] Plavix, [  ] Contraindicated, Reason_______________________________        [ x] Statin, [  ] Contraindicated, Reason_______________________________        [ x] Lasix , [  ] Contraindicated, Reason_______________________________              Duration: _____        [ x] Beta-Blocker, [  ] Contraindicated, Reason_______________________________

## 2022-10-24 NOTE — PROGRESS NOTE ADULT - ASSESSMENT
62 year old Cantonese-speaking male, current smoker, with PMHx HTN, uncontrolled DM-2 (A1c 14.0) and HLD (, recently started on statin), moderate b/l carotid stenosis, presented to Dr. Jerez with c/o leg pain with ambulation and DAMON. He reports occasional chest pain but does not walk much due to DAMON and leg fatigue. Exercise NST 9/2/22: hypotensive response to exercise with marked ST depression on EKG; inferior and inferolateral infarct with moderate edgardo-infarct ischemia; large anterior wall ischemia., TID 1.19, EF 38%. TTE 8/9/22: EF 40-45%, grade 1 diastolic dysfunction, akinetic basal inferolateral, basal inferior, mid inferolateral, mid inferior, and mid inferoseptal. Dyskinetic mid anterolateral. No significant valvular disease. Patient was referred for cardiac catheterization revealing 95% mLAD, Diag1 50%, mLCx 100%  (with L-L collaterals), RCA subtotal 90% stenosis, dRCA 80% diffuse disease, EF 35-40% with mid/basal akinesis. Patient was admitted to [facility] under the care of Dr. Graves and on 10/19 underwent uncomplicated OPCAB x3 (LIMA-LAD, SVG-PDA, Radial-OM). Patient arrived to ICU on no gtts, but Levo was added in short postop course for low BP. He was volumized and Levo was weaned. POD1 patient received continued volume resuscitation, L pleural tube was removed, and was transferred to mini-ICU. POD2 patient H/H downtrending to 7/20, repeat CBC was stable, received 1 uPRBC, monitoring Cr of 1.5. POD3 H/H uptrending, 1 more uPRBC in AM, Cr downtrending to 1.4. POD4 Cr down to 1.25, Bilateral Pleural effusions on cxr in AM. Left pigtail catheter placed, draining 500cc serosanguinous fluid. Pigtail removed in afternoon. POD4 pt feeling well, no acute complaints today. Improvement of pleural effusion on ptx. Pt feels ready for discharge. The discharge plan was discussed with Dr. Graves on rounds. Followed up with endo who have given final reccs for home DM medications.     Over 35 minutes was spent with the patient reviewing the discharge material including medications, follow up appointments, recovery, concerning symptoms, and how to contact their health care providers if they have questions    CARDIAC SURGERY DISCHARGE CHECKLIST:      CABG        [ x] Aspirin, [  ] Contraindicated, Reason_______________________________        [ x] Plavix, [  ] Contraindicated, Reason_______________________________        [ x] Statin, [  ] Contraindicated, Reason_______________________________        [ x] Lasix , [  ] Contraindicated, Reason_______________________________              Duration: _____        [ x] Beta-Blocker, [  ] Contraindicated, Reason_______________________________            Med Reconciliation:  Medication Reconciliation Status	Admission Reconciliation is Completed  Discharge Reconciliation is Completed  	  Discharge Medications	Actos 30 mg oral tablet: 1 tab(s) orally once a day  amLODIPine 2.5 mg oral tablet: 1 tab(s) orally once a day  aspirin 81 mg oral delayed release tablet: 1 tab(s) orally once a day  atorvastatin 40 mg oral tablet: 1 tab(s) orally once a day  clopidogrel 75 mg oral tablet: 1 tab(s) orally once a day  furosemide 40 mg oral tablet: 1 tab(s) orally once a day  Januvia 100 mg oral tablet: 1 tab(s) orally once a day   metFORMIN 1000 mg oral tablet: 1 tab(s) orally 2 times a day  metoprolol succinate 25 mg oral tablet, extended release: 1 tab(s) orally once a day  pantoprazole 40 mg oral delayed release tablet: 1 tab(s) orally once a day (before a meal)  varenicline 0.5 mg oral tablet: 1 tab(s) orally 2 times a day  	  ,  ,     Care Plan/Procedures:  Discharge Diagnoses, Assessment and Plan of Treatment	PRINCIPAL DISCHARGE DIAGNOSIS  Diagnosis: CAD (coronary artery disease)  Assessment and Plan of Treatment:  Discharge Procedures, Findings and Treatment	PRINCIPAL PROCEDURE  Procedure: CABG, with SARITHA  Findings and Treatment: Off pump  Goal(s)	To get better and follow your care plan as instructed.     Follow Up:  Care Providers for Follow up (PCP/Outpatient Provider)	Lucio Graves)  Cardiovascular Surgery  [address] [place] [address]  Phone: [phone]  Fax: [phone]  Follow Up Time: 1 week    Soledad Jerez)  Cardiovascular Medicine  [facility], [address] [place] [address]  Phone: [phone]  Fax: [phone]  Follow Up Time: 1 month    [facility],   [address], [facility] [place] [address]  Phone: [phone]  Fax: (   )    -  Follow Up Time: 2 weeks  Patient's Scheduled Appointments	Lucio Graves  [facility]  [facility] [address]  Scheduled Appointment: 11/01/2022  Additional Scheduled Appointments	The office will call you with all follow up appointments, but if you do not hear from them by 10/26, please call [phone] for more information.  Discharge Diet	DASH Diet  Activity	Showering allowed, Walking - Indoors allowed, Walking - Outdoors allowed  Additional Instructions	-Walk daily as tolerated and use your incentive spirometer 10 times every hour while you are awake.     -Please weigh yourself daily. If you notice over a 3 pound weight gain in 3 days, this is a sign you are likely retaining too much fluid. It is imperative you call our right away with unexplained rapid weight gain.      -Please continue to wear the compression stockings given to you in the hospital at home. This is a way to prevent fluid from building up in your legs.     -No driving or strenuous activity/exercise until cleared by your surgeon.    -Gently clean your incisions with unscented/antibacterial soap and water, pat dry.  You may leave them open to air.    -Call your doctor if you have shortness of breath, chest pain not relieved by pain medication, dizziness, fever >101.5, or increased redness or drainage from incisions.

## 2022-10-24 NOTE — PROGRESS NOTE ADULT - SUBJECTIVE AND OBJECTIVE BOX
Patient discussed on morning rounds with Dr. Graves    OPERATION & DATE: 10/19 OPCAB x 3 LIMA-LAD, SVG-PDA, Radial-OM    SUBJECTIVE ASSESSMENT: Pt is resting comfortably in chair. Denies CP, SOB. Denies weakness. Feeling overall very well today, denying any acute complaints. Feels ready for DC today.     VITAL SIGNS:  Vital Signs Last 24 Hrs  T(C): 36.1 (24 Oct 2022 09:52), Max: 36.8 (23 Oct 2022 21:54)  T(F): 97 (24 Oct 2022 09:52), Max: 98.2 (23 Oct 2022 21:54)  HR: 86 (24 Oct 2022 08:53) (83 - 91)  BP: 94/57 (24 Oct 2022 08:53) (94/57 - 121/73)  BP(mean): 70 (24 Oct 2022 08:53) (70 - 92)  RR: 18 (24 Oct 2022 08:53) (18 - 22)  SpO2: 97% (24 Oct 2022 08:53) (95% - 97%)    Parameters below as of 24 Oct 2022 08:53  Patient On (Oxygen Delivery Method): room air      I&O's Detail    23 Oct 2022 07:01  -  24 Oct 2022 07:00  --------------------------------------------------------  IN:    Oral Fluid: 850 mL  Total IN: 850 mL    OUT:    Chest Tube (mL): 500 mL    Voided (mL): 1250 mL  Total OUT: 1750 mL    Total NET: -900 mL        CHEST TUBE:  none  SHERI DRAIN:  none  EPICARDIAL WIRES: in place, plan to cut  STITCHES:  STAPLES:  GREER:   CENTRAL LINE:  MIDLINE/PICC:  WOUND VAC:    PHYSICAL EXAM:  General:  HEENT:  Cardio:  Pulm:  GI:  Extremities:  Vascular:  Incisions:    LABS:                        9.5    6.16  )-----------( 171      ( 24 Oct 2022 05:30 )             27.4     PT/INR - ( 24 Oct 2022 05:30 )   PT: 10.5 sec;   INR: 0.88          PTT - ( 24 Oct 2022 05:30 )  PTT:30.4 sec  10-24    134<L>  |  102  |  35<H>  ----------------------------<  144<H>  4.0   |  21<L>  |  1.34<H>    Ca    8.8      24 Oct 2022 05:30  Phos  3.7     10-24  Mg     1.8     10-24    TPro  5.9<L>  /  Alb  3.1<L>  /  TBili  1.0  /  DBili  x   /  AST  30  /  ALT  16  /  AlkPhos  81  10-22      MEDICATIONS  (STANDING):  amLODIPine   Tablet 2.5 milliGRAM(s) Oral daily  aspirin enteric coated 81 milliGRAM(s) Oral daily  atorvastatin 40 milliGRAM(s) Oral at bedtime  chlorhexidine 2% Cloths 1 Application(s) Topical daily  clopidogrel Tablet 75 milliGRAM(s) Oral daily  dextrose 5%. 1000 milliLiter(s) (100 mL/Hr) IV Continuous <Continuous>  dextrose 5%. 1000 milliLiter(s) (50 mL/Hr) IV Continuous <Continuous>  dextrose 5%. 1000 milliLiter(s) (100 mL/Hr) IV Continuous <Continuous>  dextrose 5%. 1000 milliLiter(s) (50 mL/Hr) IV Continuous <Continuous>  dextrose 50% Injectable 25 Gram(s) IV Push once  dextrose 50% Injectable 12.5 Gram(s) IV Push once  dextrose 50% Injectable 25 Gram(s) IV Push once  dextrose 50% Injectable 25 Gram(s) IV Push once  dextrose 50% Injectable 12.5 Gram(s) IV Push once  dextrose 50% Injectable 25 Gram(s) IV Push once  furosemide    Tablet 40 milliGRAM(s) Oral daily  glucagon  Injectable 1 milliGRAM(s) IntraMuscular once  glucagon  Injectable 1 milliGRAM(s) IntraMuscular once  heparin   Injectable 5000 Unit(s) SubCutaneous every 12 hours  influenza   Vaccine 0.5 milliLiter(s) IntraMuscular once  insulin glargine Injectable (LANTUS) 14 Unit(s) SubCutaneous at bedtime  insulin lispro (ADMELOG) corrective regimen sliding scale   SubCutaneous Before meals and at bedtime  insulin lispro Injectable (ADMELOG) 6 Unit(s) SubCutaneous three times a day before meals  metoprolol tartrate 12.5 milliGRAM(s) Oral every 12 hours  pantoprazole    Tablet 40 milliGRAM(s) Oral before breakfast  polyethylene glycol 3350 17 Gram(s) Oral daily  senna 2 Tablet(s) Oral at bedtime  sodium chloride 0.65% Nasal 1 Spray(s) Both Nostrils every 8 hours  sodium chloride 0.9% lock flush 3 milliLiter(s) IV Push every 8 hours  sodium chloride 0.9%. 1000 milliLiter(s) (10 mL/Hr) IV Continuous <Continuous>    MEDICATIONS  (PRN):  acetaminophen     Tablet .. 650 milliGRAM(s) Oral every 6 hours PRN Mild Pain (1 - 3)  benzocaine 15 mG/menthol 3.6 mG Lozenge 1 Lozenge Oral every 2 hours PRN Sore Throat  dextrose Oral Gel 15 Gram(s) Oral once PRN Blood Glucose LESS THAN 70 milliGRAM(s)/deciliter  dextrose Oral Gel 15 Gram(s) Oral once PRN Blood Glucose LESS THAN 70 milliGRAM(s)/deciliter  levalbuterol Inhalation 0.63 milliGRAM(s) Inhalation every 8 hours PRN wheezing  oxyCODONE    IR 5 milliGRAM(s) Oral every 4 hours PRN Moderate Pain (4 - 6)  oxyCODONE    IR 10 milliGRAM(s) Oral every 6 hours PRN Severe Pain (7 - 10)    RADIOLOGY & ADDITIONAL TESTS:    < from: Xray Chest 1 View-PORTABLE IMMEDIATE (Xray Chest 1 View-PORTABLE IMMEDIATE .) (10.24.22 @ 08:06) >  FINDINGS:    Unremarkable cardiomediastinal silhouette.    Unchanged small right pleural effusion and left lower lobe linear   atelectasis. No pneumothorax.    Redemonstrated sternotomy wires and mediastinal clips. No acute   abnormalities of the soft tissues and osseous structures.      IMPRESSION:  No significant interval change.    < end of copied text >     Patient discussed on morning rounds with Dr. Graves    OPERATION & DATE: 10/19 OPCAB x 3 LIMA-LAD, SVG-PDA, Radial-OM    SUBJECTIVE ASSESSMENT: Pt is resting comfortably in chair. Denies CP, SOB. Denies weakness. Feeling overall very well today, denying any acute complaints. Feels ready for DC today.     VITAL SIGNS:  Vital Signs Last 24 Hrs  T(C): 36.1 (24 Oct 2022 09:52), Max: 36.8 (23 Oct 2022 21:54)  T(F): 97 (24 Oct 2022 09:52), Max: 98.2 (23 Oct 2022 21:54)  HR: 86 (24 Oct 2022 08:53) (83 - 91)  BP: 94/57 (24 Oct 2022 08:53) (94/57 - 121/73)  BP(mean): 70 (24 Oct 2022 08:53) (70 - 92)  RR: 18 (24 Oct 2022 08:53) (18 - 22)  SpO2: 97% (24 Oct 2022 08:53) (95% - 97%)    Parameters below as of 24 Oct 2022 08:53  Patient On (Oxygen Delivery Method): room air      I&O's Detail    23 Oct 2022 07:01  -  24 Oct 2022 07:00  --------------------------------------------------------  IN:    Oral Fluid: 850 mL  Total IN: 850 mL    OUT:    Chest Tube (mL): 500 mL    Voided (mL): 1250 mL  Total OUT: 1750 mL    Total NET: -900 mL        CHEST TUBE:  none  SHERI DRAIN:  none  EPICARDIAL WIRES: cut today  STITCHES: tie downs still in place  STAPLES: none  GREER: none  CENTRAL LINE: none  MIDLINE/PICC: none  WOUND VAC: none    PHYSICAL EXAM:  General: resting comfortably in chair in NAD  Neurological: AOx3. Motor skills grossly intact  Cardiovascular: Normal S1/S2. Regular rate/rhythm. No murmurs  Respiratory: Lungs CTA bilaterally. No wheezing or rales  Gastrointestinal: +BS in all 4 quadrants. Non-distended. Soft. Non-tender  Extremities: Strength 5/5 b/l upper/lower extremities. Sensation grossly intact upper/lower extremities. No edema. No calf tenderness.  Vascular: Radial 2+bilaterally, DP 2+ b/l  Incision Sites: sternal wound healing well with some mild oozing from wound but no dehiscense or signs of infection.       LABS:                        9.5    6.16  )-----------( 171      ( 24 Oct 2022 05:30 )             27.4     PT/INR - ( 24 Oct 2022 05:30 )   PT: 10.5 sec;   INR: 0.88          PTT - ( 24 Oct 2022 05:30 )  PTT:30.4 sec  10-24    134<L>  |  102  |  35<H>  ----------------------------<  144<H>  4.0   |  21<L>  |  1.34<H>    Ca    8.8      24 Oct 2022 05:30  Phos  3.7     10-24  Mg     1.8     10-24    TPro  5.9<L>  /  Alb  3.1<L>  /  TBili  1.0  /  DBili  x   /  AST  30  /  ALT  16  /  AlkPhos  81  10-22      MEDICATIONS  (STANDING):  amLODIPine   Tablet 2.5 milliGRAM(s) Oral daily  aspirin enteric coated 81 milliGRAM(s) Oral daily  atorvastatin 40 milliGRAM(s) Oral at bedtime  chlorhexidine 2% Cloths 1 Application(s) Topical daily  clopidogrel Tablet 75 milliGRAM(s) Oral daily  dextrose 5%. 1000 milliLiter(s) (100 mL/Hr) IV Continuous <Continuous>  dextrose 5%. 1000 milliLiter(s) (50 mL/Hr) IV Continuous <Continuous>  dextrose 5%. 1000 milliLiter(s) (100 mL/Hr) IV Continuous <Continuous>  dextrose 5%. 1000 milliLiter(s) (50 mL/Hr) IV Continuous <Continuous>  dextrose 50% Injectable 25 Gram(s) IV Push once  dextrose 50% Injectable 12.5 Gram(s) IV Push once  dextrose 50% Injectable 25 Gram(s) IV Push once  dextrose 50% Injectable 25 Gram(s) IV Push once  dextrose 50% Injectable 12.5 Gram(s) IV Push once  dextrose 50% Injectable 25 Gram(s) IV Push once  furosemide    Tablet 40 milliGRAM(s) Oral daily  glucagon  Injectable 1 milliGRAM(s) IntraMuscular once  glucagon  Injectable 1 milliGRAM(s) IntraMuscular once  heparin   Injectable 5000 Unit(s) SubCutaneous every 12 hours  influenza   Vaccine 0.5 milliLiter(s) IntraMuscular once  insulin glargine Injectable (LANTUS) 14 Unit(s) SubCutaneous at bedtime  insulin lispro (ADMELOG) corrective regimen sliding scale   SubCutaneous Before meals and at bedtime  insulin lispro Injectable (ADMELOG) 6 Unit(s) SubCutaneous three times a day before meals  metoprolol tartrate 12.5 milliGRAM(s) Oral every 12 hours  pantoprazole    Tablet 40 milliGRAM(s) Oral before breakfast  polyethylene glycol 3350 17 Gram(s) Oral daily  senna 2 Tablet(s) Oral at bedtime  sodium chloride 0.65% Nasal 1 Spray(s) Both Nostrils every 8 hours  sodium chloride 0.9% lock flush 3 milliLiter(s) IV Push every 8 hours  sodium chloride 0.9%. 1000 milliLiter(s) (10 mL/Hr) IV Continuous <Continuous>    MEDICATIONS  (PRN):  acetaminophen     Tablet .. 650 milliGRAM(s) Oral every 6 hours PRN Mild Pain (1 - 3)  benzocaine 15 mG/menthol 3.6 mG Lozenge 1 Lozenge Oral every 2 hours PRN Sore Throat  dextrose Oral Gel 15 Gram(s) Oral once PRN Blood Glucose LESS THAN 70 milliGRAM(s)/deciliter  dextrose Oral Gel 15 Gram(s) Oral once PRN Blood Glucose LESS THAN 70 milliGRAM(s)/deciliter  levalbuterol Inhalation 0.63 milliGRAM(s) Inhalation every 8 hours PRN wheezing  oxyCODONE    IR 5 milliGRAM(s) Oral every 4 hours PRN Moderate Pain (4 - 6)  oxyCODONE    IR 10 milliGRAM(s) Oral every 6 hours PRN Severe Pain (7 - 10)    RADIOLOGY & ADDITIONAL TESTS:    < from: Xray Chest 1 View-PORTABLE IMMEDIATE (Xray Chest 1 View-PORTABLE IMMEDIATE .) (10.24.22 @ 08:06) >  FINDINGS:    Unremarkable cardiomediastinal silhouette.    Unchanged small right pleural effusion and left lower lobe linear   atelectasis. No pneumothorax.    Redemonstrated sternotomy wires and mediastinal clips. No acute   abnormalities of the soft tissues and osseous structures.      IMPRESSION:  No significant interval change.    < end of copied text >

## 2022-10-24 NOTE — PROGRESS NOTE ADULT - NUTRITIONAL ASSESSMENT
This patient has been assessed with a concern for Malnutrition and has been determined to have a diagnosis/diagnoses of Underweight (BMI < 19).    This patient is being managed with:   Diet Consistent Carbohydrate/No Snacks-  Low Sodium  Entered: Oct 19 2022 10:17PM    

## 2022-10-24 NOTE — DISCHARGE NOTE PROVIDER - NSDCFUSCHEDAPPT_GEN_ALL_CORE_FT
Lucio Graves  [facility] Physician [facility]  CTSURG [address]  Scheduled Appointment: 11/01/2022

## 2022-10-24 NOTE — DISCHARGE NOTE PROVIDER - DETAILS OF MALNUTRITION DIAGNOSIS/DIAGNOSES
This patient has been assessed with a concern for Malnutrition and was treated during this hospitalization for the following Nutrition diagnosis/diagnoses:     -  10/20/2022: Underweight (BMI < 19)

## 2022-10-24 NOTE — DISCHARGE NOTE PROVIDER - CARE PROVIDER_API CALL
Lucio Graves)  Cardiovascular Surgery  [address], [address] [place] [address]  Phone: [phone]  Fax: [phone]  Follow Up Time: 1 week    Soledad Jerez)  Cardiovascular Medicine  [facility], [address] [place] [address], [address] [place] [address]  Phone: [phone]  Fax: [phone]  Follow Up Time: 1 month    [facility],   [address], [facility] [place] [address]  Phone: [phone]  Fax: (   )    -  Follow Up Time: 2 weeks

## 2022-10-24 NOTE — CHART NOTE - NSCHARTNOTEFT_GEN_A_CORE
Writer was contacted by primary team as he will be discharged this morning before rounds. Chart reviewed. Mr. Kan is a 62-year-old male with history of stable angina, positive stress test and 3-vessel CAD who was admitted for surgical intervention, status-post OPCAB on 10/19/22 (SVG and left Radial grafts). Endocrinology following for recommendations regarding diabetes management.      Capillary blood glucose and insulin received:   Yesterday  - Breakfast FS mg/dL = 4 units of premeal Lispro.   - Lunch FS mg/dL = 4 units of premeal Lispro + 6 units of sliding scale.  - Dinner FS mg/dL = 6 units of premeal Lispro.  - Bedtime FS mg/dL = 14 units of Lantus.    Today  - Breakfast FS mg/dL = 6 units of premeal Lispro.    A1C: 8.9%  Weight: 44.5 kg  BMI: 16.8  Creatinine: 1.34  GFR: 59  Ejection Fraction: 45% (10/19/22)  Home regimen: Actos 30mg daily, metformin 1g twice daily, glipizide 10mg daily.    # Type 2 diabetes mellitus  - Patient reported having low fasting blood glucose levels ( mg/dL). Therefore, will recommend to stop his sulfonylurea and start a DPP-4 inhibitor.   - For discharge, patient can continue with Actos 30 mg daily, metformin 1,000 mg twice daily and Januvia 100 mg daily.     Rickie Raya  Endocrinology Fellow Writer was contacted by primary team as he will be discharged this morning before rounds. Chart reviewed. Mr. Kan is a 62-year-old male with history of stable angina, positive stress test and 3-vessel CAD who was admitted for surgical intervention, status-post OPCAB on 10/19/22 (SVG and left Radial grafts). Endocrinology following for recommendations regarding diabetes management.      Capillary blood glucose and insulin received:   Yesterday  - Breakfast FS mg/dL = 4 units of premeal Lispro.   - Lunch FS mg/dL = 4 units of premeal Lispro + 6 units of sliding scale.  - Dinner FS mg/dL = 6 units of premeal Lispro.  - Bedtime FS mg/dL = 14 units of Lantus.    Today  - Breakfast FS mg/dL = 6 units of premeal Lispro.    A1C: 8.9%  Weight: 44.5 kg  BMI: 16.8  Creatinine: 1.34  GFR: 59  Ejection Fraction: 45% (10/19/22)  Home regimen: Actos 30mg daily, metformin 1g twice daily, glipizide 10mg daily.    # Type 2 diabetes mellitus  - Patient reported having low fasting blood glucose levels ( mg/dL). Therefore, will recommend to stop his sulfonylurea and start a DPP-4 inhibitor.   - For discharge, patient can continue with Actos 30 mg daily, metformin 1,000 mg twice daily and Januvia 100 mg daily.     Rickie Raya  Endocrinology Fellow    Attending:  Above discussed with Dr. Stauffer.  Glucoses have been well controlled on modest doses of insulin, with the spike to 300 at lunch yesterday almost certainly due to dietary factors (though we were unable to determine what items might have been involved).  In any event, his control was improving as an outpatient, and the only change we would make would be to change the sulfonylurea in his home regimen to Januvia--with less risk of hypoglycemia  He has an outpatient endo (Dr. Roman) who he can return to for follow-up    OLGA Ohara MD

## 2022-10-24 NOTE — DISCHARGE NOTE PROVIDER - CARE PROVIDERS DIRECT ADDRESSES
[email or web address]

## 2022-10-24 NOTE — DISCHARGE NOTE NURSING/CASE MANAGEMENT/SOCIAL WORK - NSDCPEFALRISK_GEN_ALL_CORE
For information on Fall & Injury Prevention, visit: [email or web address] OR  [email or web address] OR  https://www.cdc.gov/steadi/patient.html

## 2022-10-24 NOTE — DISCHARGE NOTE PROVIDER - NSDCFUADDAPPT_GEN_ALL_CORE_FT
The office will call you with all follow up appointments, but if you do not hear from them by 10/26, please call [phone] for more information.

## 2022-10-24 NOTE — DISCHARGE NOTE PROVIDER - NSDCMRMEDTOKEN_GEN_ALL_CORE_FT
Actos 30 mg oral tablet: 1 tab(s) orally once a day  atorvastatin 40 mg oral tablet: 1 tab(s) orally once a day  glipiZIDE 10 mg oral tablet: 1 tab(s) orally once a day  losartan 50 mg oral tablet: 1 tab(s) orally once a day  metFORMIN 1000 mg oral tablet: 1 tab(s) orally 2 times a day  metoprolol succinate 25 mg oral tablet, extended release: 1 tab(s) orally once a day  varenicline 0.5 mg oral tablet: 1 tab(s) orally 2 times a day   Actos 30 mg oral tablet: 1 tab(s) orally once a day  amLODIPine 2.5 mg oral tablet: 1 tab(s) orally once a day  aspirin 81 mg oral delayed release tablet: 1 tab(s) orally once a day  atorvastatin 40 mg oral tablet: 1 tab(s) orally once a day  clopidogrel 75 mg oral tablet: 1 tab(s) orally once a day  Januvia 100 mg oral tablet: 1 tab(s) orally once a day   metFORMIN 1000 mg oral tablet: 1 tab(s) orally 2 times a day  metoprolol succinate 25 mg oral tablet, extended release: 1 tab(s) orally once a day  pantoprazole 40 mg oral delayed release tablet: 1 tab(s) orally once a day (before a meal)  varenicline 0.5 mg oral tablet: 1 tab(s) orally 2 times a day   Actos 30 mg oral tablet: 1 tab(s) orally once a day  amLODIPine 2.5 mg oral tablet: 1 tab(s) orally once a day  aspirin 81 mg oral delayed release tablet: 1 tab(s) orally once a day  atorvastatin 40 mg oral tablet: 1 tab(s) orally once a day  clopidogrel 75 mg oral tablet: 1 tab(s) orally once a day  furosemide 40 mg oral tablet: 1 tab(s) orally once a day  Januvia 100 mg oral tablet: 1 tab(s) orally once a day   metFORMIN 1000 mg oral tablet: 1 tab(s) orally 2 times a day  metoprolol succinate 25 mg oral tablet, extended release: 1 tab(s) orally once a day  pantoprazole 40 mg oral delayed release tablet: 1 tab(s) orally once a day (before a meal)  varenicline 0.5 mg oral tablet: 1 tab(s) orally 2 times a day

## 2022-10-24 NOTE — DISCHARGE NOTE NURSING/CASE MANAGEMENT/SOCIAL WORK - PATIENT PORTAL LINK FT
You can access the FollowMyHealth Patient Portal offered by [facility] by registering at the following website: [email or web address]. By joining [employer]’s FollowMyHealth portal, you will also be able to view your health information using other applications (apps) compatible with our system.

## 2022-10-24 NOTE — DISCHARGE NOTE NURSING/CASE MANAGEMENT/SOCIAL WORK - NSDCFUADDAPPT_GEN_ALL_CORE_FT
The office will call you with all follow up appointments, but if you do not hear from them by 10/26, please call [phone] for more information.

## 2022-10-25 ENCOUNTER — APPOINTMENT (OUTPATIENT)
Dept: CARE COORDINATION | Facility: HOME HEALTH | Age: 62
End: 2022-10-25

## 2022-10-25 ENCOUNTER — NON-APPOINTMENT (OUTPATIENT)
Age: 62
End: 2022-10-25

## 2022-10-25 VITALS
DIASTOLIC BLOOD PRESSURE: 72 MMHG | HEART RATE: 72 BPM | WEIGHT: 99 LBS | RESPIRATION RATE: 12 BRPM | OXYGEN SATURATION: 96 % | BODY MASS INDEX: 17.03 KG/M2 | SYSTOLIC BLOOD PRESSURE: 126 MMHG

## 2022-10-25 PROCEDURE — 99024 POSTOP FOLLOW-UP VISIT: CPT

## 2022-10-25 RX ORDER — LOSARTAN POTASSIUM 50 MG/1
50 TABLET, FILM COATED ORAL DAILY
Qty: 30 | Refills: 3 | Status: DISCONTINUED | COMMUNITY
Start: 2022-10-17 | End: 2022-10-25

## 2022-10-25 RX ORDER — GLIPIZIDE 10 MG/1
10 TABLET ORAL
Qty: 90 | Refills: 0 | Status: DISCONTINUED | COMMUNITY
Start: 2022-10-17 | End: 2022-10-25

## 2022-10-25 NOTE — ASSESSMENT
[FreeTextEntry1] : Pt recovering well at home s/p cabg. Reviewed all medications and dosages with pt understanding. Pt dispenses meds appropriately into med dispenser each week. Pt has all medications in home and is taking as prescribed. Denies pain at this time. No new symptoms, issues or concerns to report at this time. Appt schedule reviewed with pt verbalizing understanding.\par \par

## 2022-10-25 NOTE — HISTORY OF PRESENT ILLNESS
[FreeTextEntry1] : 62 year old Cantonese-speaking male, current smoker, with PMHx HTN,\par uncontrolled DM-2 (A1c 14.0) and HLD (, recently started on statin),\par moderate b/l carotid stenosis, presented to Dr. Jerez with c/o leg pain with\par ambulation and DAMON. He reports occasional chest pain but does not walk much due\par to DAMON and leg fatigue. Exercise NST 9/2/22: hypotensive response to exercise\par with marked ST depression on EKG; inferior and inferolateral infarct with\par moderate edgardo-infarct ischemia; large anterior wall ischemia., TID 1.19, EF\par 38%. TTE 8/9/22: EF 40-45%, grade 1 diastolic dysfunction, akinetic basal\par inferolateral, basal inferior, mid inferolateral, mid inferior, and mid\par inferoseptal. Dyskinetic mid anterolateral. No significant valvular disease.\par Patient was referred for cardiac catheterization revealing 95% mLAD, Diag1 50%,\par mLCx 100%  (with L-L collaterals), RCA subtotal 90% stenosis, dRCA 80%\par diffuse disease, EF 35-40% with mid/basal akinesis. Patient was admitted to CTS\par under the care of Dr. Graves and on 10/19 underwent uncomplicated OPCAB x3\par (LIMA-LAD, SVG-PDA, Radial-OM). Patient arrived to ICU on no gtts, but Levo was\par added in short postop course for low BP. He was volumized and Levo was weaned.\par POD1 patient received continued volume resuscitation, L pleural tube was\par removed, and was transferred to mini-ICU. POD2 patient H/H downtrending to\par 7/20, repeat CBC was stable, received 1 uPRBC, monitoring Cr of 1.5. POD3 H/H\par uptrending, 1 more uPRBC in AM, Cr downtrending to 1.4. POD4 Cr down to 1.25,\par Bilateral Pleural effusions on cxr in AM. Left pigtail catheter placed,\par draining 500cc serosanguinous fluid. Pigtail removed in afternoon. POD4 pt\par feeling well, no acute complaints today. Improvement of pleural effusion on\par ptx. Pt feels ready for discharge. The discharge plan was discussed with \ara Graves on rounds.\par

## 2022-10-25 NOTE — PHYSICAL EXAM
[Neck Appearance] : the appearance of the neck was normal [Neck Cervical Mass (___cm)] : no neck mass was observed [FreeTextEntry1] : MSI and CT sites without erythema, drainage or warmth, with edges well approximated. Sternum stable\par \par   [FreeTextEntry2] : SVG and radial harvest site without erythema, warmth or drainage. Resolving ecchymosis to thigh area. \par \par   [Abnormal Walk] : normal gait [Skin Color & Pigmentation] : normal skin color and pigmentation [] : no rash [Cranial Nerves] : cranial nerves 2-12 were intact [Oriented To Time, Place, And Person] : oriented to person, place, and time

## 2022-10-26 RX ORDER — CLOPIDOGREL BISULFATE 75 MG/1
75 TABLET, FILM COATED ORAL DAILY
Qty: 1 | Refills: 6 | Status: ACTIVE | COMMUNITY
Start: 2022-10-25

## 2022-10-26 RX ORDER — VARENICLINE 0.5 MG/1
0.5 TABLET, FILM COATED ORAL TWICE DAILY
Refills: 0 | Status: ACTIVE | COMMUNITY
Start: 2022-10-25

## 2022-10-26 RX ORDER — SITAGLIPTIN 100 MG/1
100 TABLET, FILM COATED ORAL DAILY
Qty: 30 | Refills: 0 | Status: ACTIVE | COMMUNITY
Start: 2022-10-25

## 2022-10-26 RX ORDER — FUROSEMIDE 40 MG/1
40 TABLET ORAL DAILY
Qty: 30 | Refills: 1 | Status: ACTIVE | COMMUNITY
Start: 2022-10-25

## 2022-10-26 RX ORDER — ASPIRIN 81 MG/1
81 TABLET ORAL DAILY
Refills: 0 | Status: ACTIVE | COMMUNITY
Start: 2022-10-25

## 2022-10-26 RX ORDER — AMLODIPINE BESYLATE 2.5 MG/1
2.5 TABLET ORAL DAILY
Qty: 30 | Refills: 3 | Status: ACTIVE | COMMUNITY
Start: 2022-10-25

## 2022-10-26 RX ORDER — PANTOPRAZOLE 40 MG/1
40 TABLET, DELAYED RELEASE ORAL DAILY
Qty: 30 | Refills: 0 | Status: ACTIVE | COMMUNITY
Start: 2022-10-25

## 2022-11-01 ENCOUNTER — OUTPATIENT (OUTPATIENT)
Dept: OUTPATIENT SERVICES | Facility: HOSPITAL | Age: 62
LOS: 1 days | End: 2022-11-01
Payer: COMMERCIAL

## 2022-11-01 ENCOUNTER — APPOINTMENT (OUTPATIENT)
Dept: CARDIOTHORACIC SURGERY | Facility: CLINIC | Age: 62
End: 2022-11-01

## 2022-11-01 VITALS
HEART RATE: 75 BPM | HEIGHT: 63 IN | TEMPERATURE: 97.3 F | WEIGHT: 98 LBS | DIASTOLIC BLOOD PRESSURE: 75 MMHG | BODY MASS INDEX: 17.36 KG/M2 | RESPIRATION RATE: 12 BRPM | SYSTOLIC BLOOD PRESSURE: 137 MMHG | OXYGEN SATURATION: 99 %

## 2022-11-01 DIAGNOSIS — Z00.00 ENCOUNTER FOR GENERAL ADULT MEDICAL EXAMINATION W/OUT ABNORMAL FINDINGS: ICD-10-CM

## 2022-11-01 DIAGNOSIS — Z95.1 PRESENCE OF AORTOCORONARY BYPASS GRAFT: ICD-10-CM

## 2022-11-01 DIAGNOSIS — Z09 ENCOUNTER FOR FOLLOW-UP EXAMINATION AFTER COMPLETED TREATMENT FOR CONDITIONS OTHER THAN MALIGNANT NEOPLASM: ICD-10-CM

## 2022-11-01 PROCEDURE — 71046 X-RAY EXAM CHEST 2 VIEWS: CPT

## 2022-11-01 PROCEDURE — 71046 X-RAY EXAM CHEST 2 VIEWS: CPT | Mod: 26

## 2022-11-01 PROCEDURE — 99024 POSTOP FOLLOW-UP VISIT: CPT

## 2022-11-08 ENCOUNTER — APPOINTMENT (OUTPATIENT)
Dept: ENDOCRINOLOGY | Facility: CLINIC | Age: 62
End: 2022-11-08

## 2022-11-23 ENCOUNTER — TRANSCRIPTION ENCOUNTER (OUTPATIENT)
Age: 62
End: 2022-11-23

## 2024-06-04 NOTE — CONSULT NOTE ADULT - ALLERGIC/IMMUNOLOGIC
Patient arrives to ED from home. CC of mechanical fall yesterday while walking into store. Patient endorses right arm pain including wrist and shoulder. Denies hitting head, no LOC, no blood thinner use. Pain rated 8/10.     Hx paralyzed left arm.  
negative

## (undated) DEVICE — SUT SILK 6-0 30" C-1

## (undated) DEVICE — GLV 6.5 PROTEXIS (WHITE)

## (undated) DEVICE — BLADE ETHICON HARMONIC SYNERGY HOOK TIP 3MM 4CM-9CM

## (undated) DEVICE — SUT STAINLESS STEEL 7 4-18" CCS

## (undated) DEVICE — Device

## (undated) DEVICE — MEDTRONIC URCHIN EVO HEART POSITIONER & CANISTER TUBING SET

## (undated) DEVICE — TUBING SMOKE EVAC 3/8" X 10FT FOR NEPTUNE

## (undated) DEVICE — SUT PROLENE 6-0 30" RB-2

## (undated) DEVICE — DRAPE FLUID WARMER 44 X 66"

## (undated) DEVICE — SUT SILK 2-0 30" SH

## (undated) DEVICE — SUT DOUBLE 6 WIRE STERNAL

## (undated) DEVICE — ACROBAT I-STABILIZER

## (undated) DEVICE — DRAPE PROBE COVER LATEX FREE 3X96"

## (undated) DEVICE — SUT VICRYL PLUS 0 27" CT

## (undated) DEVICE — GLV 7 PROTEXIS (WHITE)

## (undated) DEVICE — ELCTR BOVIE TIP BLADE INSULATED 4" EDGE

## (undated) DEVICE — GAUZE SPONGE 12PLY DMT MT 8X4

## (undated) DEVICE — DRSG ACE BANDAGE 6" LF STERILE

## (undated) DEVICE — BLADE SCALPEL SAFETY #11 WITH PLASTIC GREEN HANDLE

## (undated) DEVICE — DRAIN RESERVOIR FOR JACKSON PRATT 100CC CARDINAL

## (undated) DEVICE — NDL BLUNT 18G LOOP VESSEL MAXI WHITE

## (undated) DEVICE — GLV 7.5 PROTEXIS (WHITE)

## (undated) DEVICE — DRAPE U POLY BLUE 60X72"

## (undated) DEVICE — TISSUE STABILIZER MEDTRONIC OCTOPUS EVOLUTION

## (undated) DEVICE — SYNOVIS VASCULAR PROBE 1.5MM 15CM

## (undated) DEVICE — DRAPE TOWEL WHITE 17" X 24"

## (undated) DEVICE — SUT PROLENE 3-0 36" SH-1

## (undated) DEVICE — SUT SILK 0 18" CT-1 (POP-OFF)

## (undated) DEVICE — PACK PROCEDURE HARVEST SMARTPREP APC-60

## (undated) DEVICE — PREP SCRUB BRUSH W CHG 4%

## (undated) DEVICE — ELCTR STRYKER EXTENSION SUCTION TIP 125MM

## (undated) DEVICE — CATH IV SAFE BC 24G X 0.75" (YELLOW)

## (undated) DEVICE — SUT ETHIBOND 0 18" TIES

## (undated) DEVICE — SUMP INTRACARDIAC/PERICARDIAL 20FR 1/4" ADULT

## (undated) DEVICE — PACK OPEN HEART LNX

## (undated) DEVICE — SUT PROLENE 7-0 24" BV175-6

## (undated) DEVICE — DRSG ACE BANDAGE 6"

## (undated) DEVICE — ACROBAT I-POSITIONER

## (undated) DEVICE — FOLEY TRAY 16FR 5CC LF LUBRISIL ADVANCE TEMP CLOSED

## (undated) DEVICE — SUT NUROLON 1 18" OS-8 (POP-OFF)

## (undated) DEVICE — SUT ETHIBOND 4-0 12-18"

## (undated) DEVICE — BLADE SCALPEL SAFETY #10 WITH PLASTIC GREEN HANDLE

## (undated) DEVICE — VASOVIEW HEMOPRO ENDO SYSTEM

## (undated) DEVICE — LAP PAD 18 X 18"

## (undated) DEVICE — TUBING BRAT 2 SUCTION ASSEMBLY TWIST LOCK

## (undated) DEVICE — SUT MONOCRYL 4-0 27" PS-2 UNDYED

## (undated) DEVICE — GLV 6 PROTEXIS (WHITE)

## (undated) DEVICE — DRAPE PROBE COVER 5" X 96"

## (undated) DEVICE — ELCTR REM POLYHESIVE ADULT PT RETURN 15FT

## (undated) DEVICE — PACING CABLE (BROWN) A/V TEMP SCREW DOWN 12FT

## (undated) DEVICE — KIT APPLICATOR SPRAY FOR HARVEST SYSTEM

## (undated) DEVICE — SUT VICRYL 1 36" CTX UNDYED

## (undated) DEVICE — SUT SILK 2-0 18" SH (POP-OFF)

## (undated) DEVICE — SUT PROLENE 8-0 24" BV175-6

## (undated) DEVICE — TUBING STRYKER PNEUMOCLEAR HIGH FLOW

## (undated) DEVICE — PUNCH VASC STD 7.75" HANDLE 4.8MM DISP

## (undated) DEVICE — DRAPE IOBAN 33" X 23"

## (undated) DEVICE — CATH CV TRAY INSR ST UNIV

## (undated) DEVICE — TUBING STRYKER PNEUMOSURE HI FLOW INSUFFLATOR

## (undated) DEVICE — SUT VICRYL 2-0 27" CT-1

## (undated) DEVICE — SUT STAINLESS STEEL 6 4-18" CCS

## (undated) DEVICE — WARMING BLANKET FULL ADULT

## (undated) DEVICE — PACING CABLE (BLUE) ATRIAL TEMP SCREW DOWN 12FT

## (undated) DEVICE — DRAPE TOWEL BLUE 17" X 24"

## (undated) DEVICE — DRSG ALLEVYN LIFE 6 X 6 (PINK)

## (undated) DEVICE — PUNCH VASC STD 7.75" HANDLE 4MM DISP

## (undated) DEVICE — CATH TRIOX OXIMETRY 8F 3 LUMENS

## (undated) DEVICE — SUT VICRYL 0 27" CT

## (undated) DEVICE — GOWN TRIMAX XXL

## (undated) DEVICE — DRSG BIOPATCH DISK W CHG 1" W 4.0MM HOLE

## (undated) DEVICE — SUT PROLENE 7-0 24" BV-1

## (undated) DEVICE — PACK PROC CV DRAPE

## (undated) DEVICE — DRSG MEPILEX 10 X 25CM (4 X 10") AG

## (undated) DEVICE — AROS VESSEL CLAMP SINGLE LARGE (YELLOW) 120G

## (undated) DEVICE — ELCTR BOVIE TIP BLADE INSULATED 3" EDGE

## (undated) DEVICE — BLOWER MISTER AXIUS WITH IV SET

## (undated) DEVICE — ELCTR STRYKER NEPTUNE SMOKE EVACUATION PENCIL (GREEN)

## (undated) DEVICE — CHEST DRAIN OASIS DRY SUCTION WATER SEAL

## (undated) DEVICE — POSITIONER FOAM EGG CRATE ULNAR 2PCS (PINK)

## (undated) DEVICE — DRSG TRACH DRAINAGE 4X4

## (undated) DEVICE — KNIFE ALCON STANDARD FULL HANDLE 15 DEG (PINK)

## (undated) DEVICE — SUT PROLENE BV 130-5 8-0

## (undated) DEVICE — SOL ANTI FOG

## (undated) DEVICE — SUCTION CATH ARGYLE WHISTLE TIP 14FR STRAIGHT PACKED

## (undated) DEVICE — GETINGE VASOVIEW 7 ENDOSCOPIC VESSEL HARVESTING SYSTEM

## (undated) DEVICE — DRSG DERMABOND 0.7ML

## (undated) DEVICE — CATH NG SALEM SUMP 16FR